# Patient Record
Sex: FEMALE | Race: BLACK OR AFRICAN AMERICAN | Employment: PART TIME | URBAN - METROPOLITAN AREA
[De-identification: names, ages, dates, MRNs, and addresses within clinical notes are randomized per-mention and may not be internally consistent; named-entity substitution may affect disease eponyms.]

---

## 2017-02-17 ENCOUNTER — OFFICE VISIT (OUTPATIENT)
Dept: FAMILY MEDICINE CLINIC | Age: 57
End: 2017-02-17

## 2017-02-17 VITALS
BODY MASS INDEX: 30.29 KG/M2 | RESPIRATION RATE: 16 BRPM | SYSTOLIC BLOOD PRESSURE: 122 MMHG | DIASTOLIC BLOOD PRESSURE: 78 MMHG | WEIGHT: 182 LBS | HEART RATE: 84 BPM

## 2017-02-17 DIAGNOSIS — Z98.890 H/O CEREBRAL ANEURYSM REPAIR: ICD-10-CM

## 2017-02-17 DIAGNOSIS — Z86.79 H/O CEREBRAL ANEURYSM REPAIR: ICD-10-CM
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
## 2017-02-17 DIAGNOSIS — G44.311 INTRACTABLE ACUTE POST-TRAUMATIC HEADACHE: Primary | ICD-10-CM

## 2017-02-17 PROCEDURE — 99213 OFFICE O/P EST LOW 20 MIN: CPT | Performed by: FAMILY MEDICINE

## 2017-02-17 RX ORDER — NAPROXEN 500 MG/1
500 TABLET ORAL
COMMUNITY
Start: 2017-02-14 | End: 2017-12-11

## 2017-02-17 RX ORDER — ANTIARTHRITIC COMBINATION NO.2 900 MG
10000 TABLET ORAL ONCE
COMMUNITY

## 2017-02-17 RX ORDER — HYDROCODONE BITARTRATE AND ACETAMINOPHEN 5; 325 MG/1; MG/1
1-2 TABLET ORAL
COMMUNITY
Start: 2017-02-14 | End: 2017-12-11

## 2017-02-17 RX ORDER — METHOCARBAMOL 750 MG/1
750 TABLET, FILM COATED ORAL
COMMUNITY
Start: 2017-02-14 | End: 2017-02-24 | Stop reason: SDUPTHER

## 2017-02-20 DIAGNOSIS — B35.1 ONYCHOMYCOSIS: ICD-10-CM

## 2017-02-20 LAB
ALBUMIN SERPL-MCNC: 4.3 G/DL (ref 3.4–5)
ALP BLD-CCNC: 79 U/L (ref 40–129)
ALT SERPL-CCNC: 14 U/L (ref 10–40)
AST SERPL-CCNC: 25 U/L (ref 15–37)
BASOPHILS ABSOLUTE: 0 K/UL (ref 0–0.2)
BASOPHILS RELATIVE PERCENT: 0.6 %
BILIRUB SERPL-MCNC: 0.3 MG/DL (ref 0–1)
BILIRUBIN DIRECT: <0.2 MG/DL (ref 0–0.3)
BILIRUBIN, INDIRECT: NORMAL MG/DL (ref 0–1)
EOSINOPHILS ABSOLUTE: 0.1 K/UL (ref 0–0.6)
EOSINOPHILS RELATIVE PERCENT: 2.1 %
HCT VFR BLD CALC: 39.6 % (ref 36–48)
HEMOGLOBIN: 12.7 G/DL (ref 12–16)
LYMPHOCYTES ABSOLUTE: 1.3 K/UL (ref 1–5.1)
LYMPHOCYTES RELATIVE PERCENT: 27.1 %
MCH RBC QN AUTO: 28.6 PG (ref 26–34)
MCHC RBC AUTO-ENTMCNC: 32.1 G/DL (ref 31–36)
MCV RBC AUTO: 89.1 FL (ref 80–100)
MONOCYTES ABSOLUTE: 0.3 K/UL (ref 0–1.3)
MONOCYTES RELATIVE PERCENT: 6 %
NEUTROPHILS ABSOLUTE: 3.2 K/UL (ref 1.7–7.7)
NEUTROPHILS RELATIVE PERCENT: 64.2 %
PDW BLD-RTO: 15.3 % (ref 12.4–15.4)
PLATELET # BLD: 172 K/UL (ref 135–450)
PMV BLD AUTO: 10.6 FL (ref 5–10.5)
RBC # BLD: 4.45 M/UL (ref 4–5.2)
TOTAL PROTEIN: 7.4 G/DL (ref 6.4–8.2)
WBC # BLD: 4.9 K/UL (ref 4–11)

## 2017-02-24 ENCOUNTER — TELEPHONE (OUTPATIENT)
Dept: FAMILY MEDICINE CLINIC | Age: 57
End: 2017-02-24

## 2017-02-24 RX ORDER — METHOCARBAMOL 750 MG/1
750 TABLET, FILM COATED ORAL 2 TIMES DAILY PRN
Qty: 30 TABLET | Refills: 0 | Status: SHIPPED | OUTPATIENT
Start: 2017-02-24 | End: 2017-03-09 | Stop reason: SDUPTHER

## 2017-03-02 ENCOUNTER — TELEPHONE (OUTPATIENT)
Dept: FAMILY MEDICINE CLINIC | Age: 57
End: 2017-03-02

## 2017-03-10 RX ORDER — METHOCARBAMOL 750 MG/1
750 TABLET, FILM COATED ORAL 2 TIMES DAILY PRN
Qty: 30 TABLET | Refills: 0 | Status: SHIPPED | OUTPATIENT
Start: 2017-03-10 | End: 2017-03-29 | Stop reason: SDUPTHER

## 2017-03-29 RX ORDER — METHOCARBAMOL 750 MG/1
750 TABLET, FILM COATED ORAL 2 TIMES DAILY PRN
Qty: 30 TABLET | Refills: 0 | Status: SHIPPED | OUTPATIENT
Start: 2017-03-29 | End: 2017-12-11

## 2017-04-01 RX ORDER — VALSARTAN/HYDROCHLOROTHIAZIDE 160MG-25MG
TABLET ORAL
Qty: 30 TABLET | Refills: 3 | Status: SHIPPED | OUTPATIENT
Start: 2017-04-01 | End: 2017-04-07 | Stop reason: SDUPTHER

## 2017-04-07 RX ORDER — VALSARTAN/HYDROCHLOROTHIAZIDE 160MG-25MG
TABLET ORAL
Qty: 30 TABLET | Refills: 3 | Status: SHIPPED | OUTPATIENT
Start: 2017-04-07 | End: 2017-07-25 | Stop reason: SDUPTHER

## 2017-07-25 RX ORDER — VALSARTAN/HYDROCHLOROTHIAZIDE 160MG-25MG
TABLET ORAL
Qty: 30 TABLET | Refills: 3 | Status: SHIPPED | OUTPATIENT
Start: 2017-07-25 | End: 2017-11-29 | Stop reason: SDUPTHER

## 2017-12-06 ENCOUNTER — TELEPHONE (OUTPATIENT)
Dept: FAMILY MEDICINE CLINIC | Age: 57
End: 2017-12-06

## 2017-12-06 NOTE — TELEPHONE ENCOUNTER
Patient went to ER on 11-21-17 for stomach virus  Labs were drawn and CT and patient was informed to contact PCP re: some abnormalities.   Do not see lab results in Epic  CT of abdomen was normal.  Please contact patient

## 2017-12-08 ENCOUNTER — TELEPHONE (OUTPATIENT)
Dept: FAMILY MEDICINE CLINIC | Age: 57
End: 2017-12-08

## 2017-12-08 DIAGNOSIS — Z00.00 PHYSICAL EXAM: ICD-10-CM

## 2017-12-08 DIAGNOSIS — I10 ESSENTIAL HYPERTENSION: Primary | ICD-10-CM

## 2017-12-08 NOTE — TELEPHONE ENCOUNTER
Pt has an annual physical scheduled for 12/11 and is asking if she needs labs completed. Please advise.   Please call pt once orders have been placed

## 2017-12-11 ENCOUNTER — OFFICE VISIT (OUTPATIENT)
Dept: FAMILY MEDICINE CLINIC | Age: 57
End: 2017-12-11

## 2017-12-11 VITALS
WEIGHT: 176 LBS | SYSTOLIC BLOOD PRESSURE: 122 MMHG | DIASTOLIC BLOOD PRESSURE: 88 MMHG | BODY MASS INDEX: 29.32 KG/M2 | HEART RATE: 73 BPM | HEIGHT: 65 IN | RESPIRATION RATE: 16 BRPM | OXYGEN SATURATION: 99 %

## 2017-12-11 DIAGNOSIS — Z12.39 SCREENING FOR BREAST CANCER: ICD-10-CM

## 2017-12-11 DIAGNOSIS — I10 ESSENTIAL HYPERTENSION: ICD-10-CM

## 2017-12-11 DIAGNOSIS — R74.8 ELEVATED AMYLASE: ICD-10-CM

## 2017-12-11 DIAGNOSIS — R51.9 HEADACHE, UNSPECIFIED HEADACHE TYPE: ICD-10-CM

## 2017-12-11 DIAGNOSIS — Z00.00 WELL WOMAN EXAM WITHOUT GYNECOLOGICAL EXAM: Primary | ICD-10-CM

## 2017-12-11 DIAGNOSIS — N20.0 KIDNEY STONE ON RIGHT SIDE: ICD-10-CM

## 2017-12-11 DIAGNOSIS — R11.2 NAUSEA AND VOMITING, INTRACTABILITY OF VOMITING NOT SPECIFIED, UNSPECIFIED VOMITING TYPE: ICD-10-CM

## 2017-12-11 LAB
BILIRUBIN, POC: NORMAL
BLOOD URINE, POC: NORMAL
CLARITY, POC: CLEAR
COLOR, POC: YELLOW
GLUCOSE URINE, POC: NORMAL
KETONES, POC: NORMAL
LEUKOCYTE EST, POC: NORMAL
NITRITE, POC: NORMAL
PH, POC: 6.5
PROTEIN, POC: NORMAL
SPECIFIC GRAVITY, POC: 1.02
UROBILINOGEN, POC: 0.2

## 2017-12-11 PROCEDURE — 81002 URINALYSIS NONAUTO W/O SCOPE: CPT | Performed by: NURSE PRACTITIONER

## 2017-12-11 PROCEDURE — 99396 PREV VISIT EST AGE 40-64: CPT | Performed by: NURSE PRACTITIONER

## 2017-12-11 RX ORDER — VALSARTAN/HYDROCHLOROTHIAZIDE 160MG-25MG
1 TABLET ORAL DAILY
Qty: 90 TABLET | Refills: 3 | Status: SHIPPED | OUTPATIENT
Start: 2017-12-11 | End: 2019-02-25 | Stop reason: SDUPTHER

## 2017-12-11 RX ORDER — BUTALBITAL, ACETAMINOPHEN AND CAFFEINE 50; 325; 40 MG/1; MG/1; MG/1
1 TABLET ORAL EVERY 4 HOURS PRN
Qty: 180 TABLET | Refills: 0 | Status: SHIPPED | OUTPATIENT
Start: 2017-12-11 | End: 2018-02-12 | Stop reason: SDUPTHER

## 2017-12-11 ASSESSMENT — PATIENT HEALTH QUESTIONNAIRE - PHQ9
1. LITTLE INTEREST OR PLEASURE IN DOING THINGS: 0
2. FEELING DOWN, DEPRESSED OR HOPELESS: 0
SUM OF ALL RESPONSES TO PHQ QUESTIONS 1-9: 0
SUM OF ALL RESPONSES TO PHQ9 QUESTIONS 1 & 2: 0

## 2017-12-11 NOTE — PROGRESS NOTES
Chief Complaint:   Minoo Samson is a 62 y.o. female who presents for complete physical examination. History of Present Illness:    Here for CPE and ED follow up  [address] ED 11/21/17 for LLQ abd pain and emesis. CT abd/pelvis normal. Labs in ED: Amylase, AST, total bilirubin elevated. Incidental finding on CT- right nonobstructing kidney stone, 4 mm. Denies flank pain or urinary symptoms. States since ED feeling fine. No complaints today. abd pain and emesis resolved within 48 hours. No reoccurrence since. Hypertension:  Home blood pressure monitoring: No.  She is adherent to a low sodium diet. Patient denies chest pain, shortness of breath, headache, lightheadedness, blurred vision, peripheral edema, palpitations and dry cough. Antihypertensive medication side effects: no medication side effects noted. Use of agents associated with hypertension: none. She is  exercising regularly. Is the patient taking any over the counter medications? Yes   If yes, see med list as above  Is the patient taking a daily aspirin? No    Headaches: well controlled at present. Fioricet at home. Last prescribed 9/2016, still has pills left over. Can get couple headaches in 1 month. Usually mild and taking excedrin over the counter- most of the time that manages the pain.      Past Medical History:   Diagnosis Date    Brain aneurysm 08/2011    Dr Vivi Litten:     Headache(784.0)     Hypertension     Kidney stone on right side 11/21/2017    on CT, nonobstructing    Sarcoid ([address] 5/2015    bilateral eyes: Desi Harrison MD       Past Surgical History:   Procedure Laterality Date    BRAIN ANEURYSM SURGERY  8/2011    [facility]: Dr Maeve Kimble  08/2011    WISDOM TOOTH EXTRACTION         Outpatient Prescriptions Marked as Taking for the 12/11/17 encounter (Office Visit) with Dunia Branch CNP   Medication Sig Dispense Refill    DIOVAN -25 MG per tablet Take 1 tablet by mouth daily 90 tablet 3    or abdominal pain  Genitourinary: no urinary urgency, frequency, dysuria, nocturia, hesitancy, or incontinence  Musculoskeletal: no arthritis, arthralgia, myalgia, weakness, or morning stiffness  Neurologic: no paralysis, paresis, paresthesia, seizures, tremors, or headaches  Hematologic/Lymphatic/Immunologic: no anemia, abnormal bleeding/bruising, fever, chills, night sweats, swollen glands, or unexplained weight loss  Endocrine: no heat or cold intolerance and no polyphagia, polydipsia, or polyuria    PHYSICAL EXAMINATION:  /88 (Site: Left Arm, Position: Sitting, Cuff Size: Small Adult)   Pulse 73   Resp 16   Ht 5' 5\" (1.651 m)   Wt 176 lb (79.8 kg)   SpO2 99%   BMI 29.29 kg/m²   General appearance: healthy, alert, no distress  Skin: Skin color, texture, turgor normal. No rashes or suspicious lesions. No induration or tightening palpated. Head: Normocephalic. No masses, lesions, tenderness or abnormalities  Eyes: Conjunctivae/corneas clear. PERRL, EOM's intact. Ears: External ears normal. Canals clear. TM's clear bilaterally. Hearing grossly normal.  Nose/Sinuses: Nares normal. Septum midline. Mucosa normal. No drainage or sinus tenderness. Oropharynx: Lips, mucosa, and tongue normal. Teeth and gums normal. Oropharynx clear with no exudate seen. Neck: Neck supple, and symmetric. No adenopathy. Thyroid symmetric, normal size, without nodule. Trachea is midline. No carotid bruits were noted. Back: Back symmetric, no curvature. ROM normal. No CVA tenderness. Lungs: Good diaphragmatic excursion. Lungs clear to auscultation bilaterally. No retractions or use of accessory muscles. Heart: PMI is not displaced, and no thrill noted. Regular rate and rhythm, with no rub, murmur or gallop noted. Breasts: Exam deferred to OB/GYN  Abdomen: Abdomen soft, non-tender. BS normal. No masses, organomegaly. No hernia noted. Extremities: Extremities normal. No deformities, edema, or skin discoloration.   No cyanosis or clubbing noted to the nails. Hands and feet were warm and well-perfused with palpable dorsalis pedis pulses bilaterally. Lymph: No lymphadenopathy of the neck or supraclavicular regions. Musculoskeletal: Spine ROM normal. Muscular strength intact. Neuro: Cranial nerves intact, gait normal. Reflexes normal and symmetric, with no pathologic reflex noted. Normal sensation to light touch. Pelvic: Exam deferred to OB/GYN        UA:   Results for POC orders placed in visit on 12/11/17   POCT Urinalysis no Micro   Result Value Ref Range    Color, UA yellow     Clarity, UA clear     Glucose, UA POC neg     Bilirubin, UA neg     Ketones, UA neg     Spec Grav, UA 1.025     Blood, UA POC neg     pH, UA 6.5     Protein, UA POC trace     Urobilinogen, UA 0.2     Leukocytes, UA trace     Nitrite, UA neg            ASSESSMENT:   Complete physical without pap. 1. Well woman exam without gynecological exam    2. Kidney stone on right side    3. Elevated amylase    4. Essential hypertension    5. Nausea and vomiting, intractability of vomiting not specified, unspecified vomiting type    6. Headache, unspecified headache type    7. Screening for breast cancer          PLAN:   1. All health maintenance issues were updated. Recommend attempt to lose weight, continue current medications, continue current healthy lifestyle patterns and return for routine annual checkups  2. New problem. Reassurance today. No intervention needed at this time. 3. Repeat lab ordered  4. Stable, refills per orders. No changes today  5. Resolved. ED records reviewed. Follow up prn  6. Stable, refills per orders, no changes today  See pt instructions  F/u 6 months for BP check. Discussed use, benefit, and side effects of prescribed medications. All patient questions answered. Pt voiced understanding.

## 2017-12-11 NOTE — PATIENT INSTRUCTIONS
[facility] lab hours: Monday-Friday 7:30 am to 4 pm. No appointment necessary. First come first serve. Sign in at . Nothing but water for 10 hours for fasting labs. Increase water 24 hours before lab draw. [facility] [phone]   [place] [phone]    Lucho Verma [address] [facility].[place] [address] [phone]      Patient Education        Well Visit, Women 48 to 72: Care Instructions  Your Care Instructions    Physical exams can help you stay healthy. Your doctor has checked your overall health and may have suggested ways to take good care of yourself. He or she also may have recommended tests. At home, you can help prevent illness with healthy eating, regular exercise, and other steps. Follow-up care is a key part of your treatment and safety. Be sure to make and go to all appointments, and call your doctor if you are having problems. It's also a good idea to know your test results and keep a list of the medicines you take. How can you care for yourself at home? · Reach and stay at a healthy weight. This will lower your risk for many problems, such as obesity, diabetes, heart disease, and high blood pressure. · Get at least 30 minutes of exercise on most days of the week. Walking is a good choice. You also may want to do other activities, such as running, swimming, cycling, or playing tennis or team sports. · Do not smoke. Smoking can make health problems worse. If you need help quitting, talk to your doctor about stop-smoking programs and medicines. These can increase your chances of quitting for good. · Protect your skin from too much sun. When you're outdoors from 10 a.m. to 4 p.m., stay in the shade or cover up with clothing and a hat with a wide brim. Wear sunglasses that block UV rays. Even when it's cloudy, put broad-spectrum sunscreen (SPF 30 or higher) on any exposed skin.   · See a dentist one or two times a year for checkups and to have your teeth cleaned. · Wear a seat belt in the car. · Limit alcohol to 1 drink a day. Too much alcohol can cause health problems. Follow your doctor's advice about when to have certain tests. These tests can spot problems early. · Cholesterol. Your doctor will tell you how often to have this done based on your age, family history, or other things that can increase your risk for heart attack and stroke. · Blood pressure. Have your blood pressure checked during a routine doctor visit. Your doctor will tell you how often to check your blood pressure based on your age, your blood pressure results, and other factors. · Mammogram. Ask your doctor how often you should have a mammogram, which is an X-ray of your breasts. A mammogram can spot breast cancer before it can be felt and when it is easiest to treat. · Pap test and pelvic exam. Ask your doctor how often you should have a Pap test. You may not need to have a Pap test as often as you used to. · Vision. Have your eyes checked every year or two or as often as your doctor suggests. Some experts recommend that you have yearly exams for glaucoma and other age-related eye problems starting at age 48. · Hearing. Tell your doctor if you notice any change in your hearing. You can have tests to find out how well you hear. · Diabetes. Ask your doctor whether you should have tests for diabetes. · Colon cancer. You should begin tests for colon cancer at age 48. You may have one of several tests. Your doctor will tell you how often to have tests based on your age and risk. Risks include whether you already had a precancerous polyp removed from your colon or whether your parents, sisters and brothers, or children have had colon cancer. · Thyroid disease. Talk to your doctor about whether to have your thyroid checked as part of a regular physical exam. Women have an increased chance of a thyroid problem. · Osteoporosis.  You should begin tests for

## 2017-12-15 DIAGNOSIS — Z00.00 PHYSICAL EXAM: ICD-10-CM

## 2017-12-15 DIAGNOSIS — I10 ESSENTIAL HYPERTENSION: ICD-10-CM

## 2017-12-15 DIAGNOSIS — R74.8 ELEVATED AMYLASE: ICD-10-CM

## 2017-12-15 LAB
A/G RATIO: 1.5 (ref 1.1–2.2)
ALBUMIN SERPL-MCNC: 4.6 G/DL (ref 3.4–5)
ALP BLD-CCNC: 66 U/L (ref 40–129)
ALT SERPL-CCNC: 13 U/L (ref 10–40)
AMYLASE: 125 U/L (ref 25–115)
ANION GAP SERPL CALCULATED.3IONS-SCNC: 14 MMOL/L (ref 3–16)
AST SERPL-CCNC: 24 U/L (ref 15–37)
BASOPHILS ABSOLUTE: 0 K/UL (ref 0–0.2)
BASOPHILS RELATIVE PERCENT: 0.4 %
BILIRUB SERPL-MCNC: 0.4 MG/DL (ref 0–1)
BUN BLDV-MCNC: 15 MG/DL (ref 7–20)
CALCIUM SERPL-MCNC: 9.7 MG/DL (ref 8.3–10.6)
CHLORIDE BLD-SCNC: 102 MMOL/L (ref 99–110)
CHOLESTEROL, TOTAL: 184 MG/DL (ref 0–199)
CO2: 27 MMOL/L (ref 21–32)
CREAT SERPL-MCNC: 0.7 MG/DL (ref 0.6–1.1)
EOSINOPHILS ABSOLUTE: 0.1 K/UL (ref 0–0.6)
EOSINOPHILS RELATIVE PERCENT: 1.2 %
GFR AFRICAN AMERICAN: >60
GFR NON-AFRICAN AMERICAN: >60
GLOBULIN: 3.1 G/DL
GLUCOSE BLD-MCNC: 78 MG/DL (ref 70–99)
HCT VFR BLD CALC: 38.8 % (ref 36–48)
HDLC SERPL-MCNC: 66 MG/DL (ref 40–60)
HEMOGLOBIN: 12.6 G/DL (ref 12–16)
HEPATITIS C ANTIBODY INTERPRETATION: NORMAL
LDL CHOLESTEROL CALCULATED: 105 MG/DL
LYMPHOCYTES ABSOLUTE: 1.4 K/UL (ref 1–5.1)
LYMPHOCYTES RELATIVE PERCENT: 33 %
MCH RBC QN AUTO: 29.1 PG (ref 26–34)
MCHC RBC AUTO-ENTMCNC: 32.4 G/DL (ref 31–36)
MCV RBC AUTO: 89.8 FL (ref 80–100)
MONOCYTES ABSOLUTE: 0.3 K/UL (ref 0–1.3)
MONOCYTES RELATIVE PERCENT: 6.3 %
NEUTROPHILS ABSOLUTE: 2.5 K/UL (ref 1.7–7.7)
NEUTROPHILS RELATIVE PERCENT: 59.1 %
PDW BLD-RTO: 14.5 % (ref 12.4–15.4)
PLATELET # BLD: 168 K/UL (ref 135–450)
PMV BLD AUTO: 10.7 FL (ref 5–10.5)
POTASSIUM SERPL-SCNC: 4.3 MMOL/L (ref 3.5–5.1)
RBC # BLD: 4.32 M/UL (ref 4–5.2)
SODIUM BLD-SCNC: 143 MMOL/L (ref 136–145)
TOTAL PROTEIN: 7.7 G/DL (ref 6.4–8.2)
TRIGL SERPL-MCNC: 67 MG/DL (ref 0–150)
TSH SERPL DL<=0.05 MIU/L-ACNC: 0.82 UIU/ML (ref 0.27–4.2)
VLDLC SERPL CALC-MCNC: 13 MG/DL
WBC # BLD: 4.3 K/UL (ref 4–11)

## 2017-12-18 LAB — HIV-1 AND HIV-2 ANTIBODIES: NORMAL

## 2017-12-19 ENCOUNTER — TELEPHONE (OUTPATIENT)
Dept: FAMILY MEDICINE CLINIC | Age: 57
End: 2017-12-19

## 2017-12-26 ENCOUNTER — OFFICE VISIT (OUTPATIENT)
Dept: FAMILY MEDICINE CLINIC | Age: 57
End: 2017-12-26

## 2017-12-26 VITALS
RESPIRATION RATE: 15 BRPM | SYSTOLIC BLOOD PRESSURE: 144 MMHG | OXYGEN SATURATION: 98 % | HEART RATE: 92 BPM | BODY MASS INDEX: 30.12 KG/M2 | WEIGHT: 181 LBS | DIASTOLIC BLOOD PRESSURE: 96 MMHG

## 2017-12-26 DIAGNOSIS — S76.911A MUSCLE STRAIN OF RIGHT THIGH, INITIAL ENCOUNTER: Primary | ICD-10-CM

## 2017-12-26 DIAGNOSIS — I10 ESSENTIAL HYPERTENSION: ICD-10-CM

## 2017-12-26 PROCEDURE — 99213 OFFICE O/P EST LOW 20 MIN: CPT | Performed by: FAMILY MEDICINE

## 2017-12-26 RX ORDER — NAPROXEN 500 MG/1
500 TABLET ORAL 2 TIMES DAILY WITH MEALS
Qty: 20 TABLET | Refills: 3 | Status: SHIPPED | OUTPATIENT
Start: 2017-12-26 | End: 2021-03-26

## 2018-02-12 ENCOUNTER — TELEPHONE (OUTPATIENT)
Dept: FAMILY MEDICINE CLINIC | Age: 58
End: 2018-02-12

## 2018-02-12 DIAGNOSIS — R51.9 HEADACHE, UNSPECIFIED HEADACHE TYPE: ICD-10-CM

## 2018-02-12 RX ORDER — BUTALBITAL, ACETAMINOPHEN AND CAFFEINE 50; 325; 40 MG/1; MG/1; MG/1
1 TABLET ORAL EVERY 4 HOURS PRN
Qty: 16 TABLET | Refills: 0 | Status: SHIPPED | OUTPATIENT
Start: 2018-02-12 | End: 2018-08-06

## 2018-02-12 RX ORDER — BUTALBITAL, ACETAMINOPHEN AND CAFFEINE 50; 325; 40 MG/1; MG/1; MG/1
1 TABLET ORAL EVERY 4 HOURS PRN
Qty: 30 TABLET | Refills: 0 | OUTPATIENT
Start: 2018-02-12

## 2018-02-12 NOTE — TELEPHONE ENCOUNTER
Patient is in [place] and left her generic Fioricett at home  Patient is asking if a temporary supply of the RX- #16  Patient will be in [place] until Sunday  Please contact patient  Pharmacy in [address] [place] [address] Rd  Dolv 12-26-17

## 2018-02-12 NOTE — TELEPHONE ENCOUNTER
Patient's , July Lamb is asking if Dr. Osmel Mclaughlin can call. Very upset that this medication, generic Fioricet, can not be sent to another state.   Patient's understood RX can not go to another state    # [phone]- please contact patient  Sent message to Saige Razo and also Dr. Osmel Mclaughlin since patient's  is asking for him to call

## 2018-05-30 ENCOUNTER — TELEPHONE (OUTPATIENT)
Dept: FAMILY MEDICINE CLINIC | Age: 58
End: 2018-05-30

## 2018-06-07 ENCOUNTER — OFFICE VISIT (OUTPATIENT)
Dept: FAMILY MEDICINE CLINIC | Age: 58
End: 2018-06-07

## 2018-06-07 VITALS
OXYGEN SATURATION: 99 % | BODY MASS INDEX: 29.52 KG/M2 | HEART RATE: 77 BPM | WEIGHT: 177.4 LBS | SYSTOLIC BLOOD PRESSURE: 130 MMHG | DIASTOLIC BLOOD PRESSURE: 82 MMHG

## 2018-06-07 DIAGNOSIS — R39.9 UTI SYMPTOMS: ICD-10-CM

## 2018-06-07 DIAGNOSIS — M54.50 ACUTE LEFT-SIDED LOW BACK PAIN WITHOUT SCIATICA: Primary | ICD-10-CM

## 2018-06-07 DIAGNOSIS — L65.9 ALOPECIA: ICD-10-CM

## 2018-06-07 LAB
BILIRUBIN, POC: NORMAL
BLOOD URINE, POC: NORMAL
CLARITY, POC: CLEAR
COLOR, POC: YELLOW
GLUCOSE URINE, POC: NORMAL
KETONES, POC: NORMAL
LEUKOCYTE EST, POC: NORMAL
NITRITE, POC: NORMAL
PH, POC: 7
PROTEIN, POC: NORMAL
SPECIFIC GRAVITY, POC: 1.02
UROBILINOGEN, POC: 0.2

## 2018-06-07 PROCEDURE — 81002 URINALYSIS NONAUTO W/O SCOPE: CPT | Performed by: FAMILY MEDICINE

## 2018-06-07 PROCEDURE — 99213 OFFICE O/P EST LOW 20 MIN: CPT | Performed by: FAMILY MEDICINE

## 2018-06-07 ASSESSMENT — ENCOUNTER SYMPTOMS
RESPIRATORY NEGATIVE: 1
BACK PAIN: 1
GASTROINTESTINAL NEGATIVE: 1

## 2018-08-05 DIAGNOSIS — R51.9 HEADACHE, UNSPECIFIED HEADACHE TYPE: ICD-10-CM

## 2018-08-06 RX ORDER — BUTALBITAL, ACETAMINOPHEN AND CAFFEINE 50; 325; 40 MG/1; MG/1; MG/1
TABLET ORAL
Qty: 180 TABLET | Refills: 0 | Status: SHIPPED | OUTPATIENT
Start: 2018-08-06 | End: 2018-12-31 | Stop reason: SDUPTHER

## 2018-11-02 ENCOUNTER — TELEPHONE (OUTPATIENT)
Dept: FAMILY MEDICINE CLINIC | Age: 58
End: 2018-11-02

## 2018-11-07 ENCOUNTER — OFFICE VISIT (OUTPATIENT)
Dept: FAMILY MEDICINE CLINIC | Age: 58
End: 2018-11-07
Payer: COMMERCIAL

## 2018-11-07 VITALS
RESPIRATION RATE: 16 BRPM | WEIGHT: 180.2 LBS | DIASTOLIC BLOOD PRESSURE: 82 MMHG | SYSTOLIC BLOOD PRESSURE: 140 MMHG | BODY MASS INDEX: 29.99 KG/M2 | OXYGEN SATURATION: 99 % | HEART RATE: 77 BPM

## 2018-11-07 DIAGNOSIS — J06.9 PROTRACTED URI: Primary | ICD-10-CM

## 2018-11-07 DIAGNOSIS — Z13.31 POSITIVE DEPRESSION SCREENING: ICD-10-CM

## 2018-11-07 DIAGNOSIS — R05.9 COUGH: ICD-10-CM

## 2018-11-07 PROCEDURE — G8431 POS CLIN DEPRES SCRN F/U DOC: HCPCS | Performed by: NURSE PRACTITIONER

## 2018-11-07 PROCEDURE — G0444 DEPRESSION SCREEN ANNUAL: HCPCS | Performed by: NURSE PRACTITIONER

## 2018-11-07 PROCEDURE — 99213 OFFICE O/P EST LOW 20 MIN: CPT | Performed by: NURSE PRACTITIONER

## 2018-11-07 RX ORDER — BENZONATATE 200 MG/1
200 CAPSULE ORAL 3 TIMES DAILY PRN
Qty: 20 CAPSULE | Refills: 0 | Status: SHIPPED | OUTPATIENT
Start: 2018-11-07 | End: 2018-11-14

## 2018-11-07 RX ORDER — AZITHROMYCIN 250 MG/1
TABLET, FILM COATED ORAL
Qty: 1 PACKET | Refills: 0 | Status: SHIPPED | OUTPATIENT
Start: 2018-11-07 | End: 2019-04-24 | Stop reason: SDUPTHER

## 2018-11-07 ASSESSMENT — PATIENT HEALTH QUESTIONNAIRE - PHQ9
3. TROUBLE FALLING OR STAYING ASLEEP: 0
6. FEELING BAD ABOUT YOURSELF - OR THAT YOU ARE A FAILURE OR HAVE LET YOURSELF OR YOUR FAMILY DOWN: 0
SUM OF ALL RESPONSES TO PHQ QUESTIONS 1-9: 9
5. POOR APPETITE OR OVEREATING: 2
2. FEELING DOWN, DEPRESSED OR HOPELESS: 3
8. MOVING OR SPEAKING SO SLOWLY THAT OTHER PEOPLE COULD HAVE NOTICED. OR THE OPPOSITE, BEING SO FIGETY OR RESTLESS THAT YOU HAVE BEEN MOVING AROUND A LOT MORE THAN USUAL: 0
10. IF YOU CHECKED OFF ANY PROBLEMS, HOW DIFFICULT HAVE THESE PROBLEMS MADE IT FOR YOU TO DO YOUR WORK, TAKE CARE OF THINGS AT HOME, OR GET ALONG WITH OTHER PEOPLE: 0
SUM OF ALL RESPONSES TO PHQ QUESTIONS 1-9: 9
SUM OF ALL RESPONSES TO PHQ9 QUESTIONS 1 & 2: 5
1. LITTLE INTEREST OR PLEASURE IN DOING THINGS: 2
4. FEELING TIRED OR HAVING LITTLE ENERGY: 1
7. TROUBLE CONCENTRATING ON THINGS, SUCH AS READING THE NEWSPAPER OR WATCHING TELEVISION: 1
9. THOUGHTS THAT YOU WOULD BE BETTER OFF DEAD, OR OF HURTING YOURSELF: 0

## 2018-11-07 ASSESSMENT — ENCOUNTER SYMPTOMS
CHEST TIGHTNESS: 1
COUGH: 1
SINUS PRESSURE: 1
RHINORRHEA: 1
SORE THROAT: 1
WHEEZING: 0
SHORTNESS OF BREATH: 1

## 2018-11-07 NOTE — PATIENT INSTRUCTIONS
Patient Education        Upper Respiratory Infection (Cold): Care Instructions  Your Care Instructions    An upper respiratory infection, or URI, is an infection of the nose, sinuses, or throat. URIs are spread by coughs, sneezes, and direct contact. The common cold is the most frequent kind of URI. The flu and sinus infections are other kinds of URIs. Almost all URIs are caused by viruses. Antibiotics won't cure them. But you can treat most infections with home care. This may include drinking lots of fluids and taking over-the-counter pain medicine. You will probably feel better in 4 to 10 days. The doctor has checked you carefully, but problems can develop later. If you notice any problems or new symptoms, get medical treatment right away. Follow-up care is a key part of your treatment and safety. Be sure to make and go to all appointments, and call your doctor if you are having problems. It's also a good idea to know your test results and keep a list of the medicines you take. How can you care for yourself at home? · To prevent dehydration, drink plenty of fluids, enough so that your urine is light yellow or clear like water. Choose water and other caffeine-free clear liquids until you feel better. If you have kidney, heart, or liver disease and have to limit fluids, talk with your doctor before you increase the amount of fluids you drink. · Take an over-the-counter pain medicine, such as acetaminophen (Tylenol), ibuprofen (Advil, Motrin), or naproxen (Aleve). Read and follow all instructions on the label. · Before you use cough and cold medicines, check the label. These medicines may not be safe for young children or for people with certain health problems. · Be careful when taking over-the-counter cold or flu medicines and Tylenol at the same time. Many of these medicines have acetaminophen, which is Tylenol. Read the labels to make sure that you are not taking more than the recommended dose.  Too much acetaminophen (Tylenol) can be harmful. · Get plenty of rest.  · Do not smoke or allow others to smoke around you. If you need help quitting, talk to your doctor about stop-smoking programs and medicines. These can increase your chances of quitting for good. When should you call for help? Call 911 anytime you think you may need emergency care. For example, call if:    · You have severe trouble breathing.    Call your doctor now or seek immediate medical care if:    · You seem to be getting much sicker.     · You have new or worse trouble breathing.     · You have a new or higher fever.     · You have a new rash.    Watch closely for changes in your health, and be sure to contact your doctor if:    · You have a new symptom, such as a sore throat, an earache, or sinus pain.     · You cough more deeply or more often, especially if you notice more mucus or a change in the color of your mucus.     · You do not get better as expected. Where can you learn more? Go to https://Lineagen.Biexdiao.com. org and sign in to your Rambus account. Enter M464 in the Graphicly box to learn more about \"Upper Respiratory Infection (Cold): Care Instructions. \"     If you do not have an account, please click on the \"Sign Up Now\" link. Current as of: December 6, 2017  Content Version: 11.8  © 1902-4850 Healthwise, Incorporated. Care instructions adapted under license by [facility] ([facility]). If you have questions about a medical condition or this instruction, always ask your healthcare professional. [place] [address] any warranty or liability for your use of this information. Patient Education        Cough: Care Instructions  Your Care Instructions    A cough is your body's response to something that bothers your throat or airways. Many things can cause a cough. You might cough because of a cold or the flu, bronchitis, or asthma.  Smoking, postnasal drip, allergies, and stomach acid that backs up

## 2018-12-31 DIAGNOSIS — R51.9 HEADACHE, UNSPECIFIED HEADACHE TYPE: ICD-10-CM

## 2019-01-02 RX ORDER — BUTALBITAL, ACETAMINOPHEN AND CAFFEINE 50; 325; 40 MG/1; MG/1; MG/1
TABLET ORAL
Qty: 180 TABLET | Refills: 0 | Status: SHIPPED | OUTPATIENT
Start: 2019-01-02 | End: 2019-05-27 | Stop reason: SDUPTHER

## 2019-01-08 ENCOUNTER — NURSE TRIAGE (OUTPATIENT)
Dept: OTHER | Facility: CLINIC | Age: 59
End: 2019-01-08

## 2019-01-08 ENCOUNTER — TELEPHONE (OUTPATIENT)
Dept: FAMILY MEDICINE CLINIC | Age: 59
End: 2019-01-08

## 2019-01-08 ENCOUNTER — OFFICE VISIT (OUTPATIENT)
Dept: FAMILY MEDICINE CLINIC | Age: 59
End: 2019-01-08
Payer: COMMERCIAL

## 2019-01-08 VITALS
WEIGHT: 180 LBS | DIASTOLIC BLOOD PRESSURE: 80 MMHG | HEART RATE: 77 BPM | HEIGHT: 65 IN | SYSTOLIC BLOOD PRESSURE: 128 MMHG | OXYGEN SATURATION: 96 % | TEMPERATURE: 98.9 F | BODY MASS INDEX: 29.99 KG/M2

## 2019-01-08 DIAGNOSIS — I10 ESSENTIAL HYPERTENSION: ICD-10-CM

## 2019-01-08 DIAGNOSIS — Z12.31 ENCOUNTER FOR SCREENING MAMMOGRAM FOR BREAST CANCER: Primary | ICD-10-CM

## 2019-01-08 DIAGNOSIS — R07.9 CHEST PAIN, UNSPECIFIED TYPE: ICD-10-CM

## 2019-01-08 DIAGNOSIS — D86.9 SARCOID: ICD-10-CM

## 2019-01-08 PROCEDURE — 93000 ELECTROCARDIOGRAM COMPLETE: CPT | Performed by: FAMILY MEDICINE

## 2019-01-08 PROCEDURE — 99213 OFFICE O/P EST LOW 20 MIN: CPT | Performed by: FAMILY MEDICINE

## 2019-01-08 RX ORDER — PANTOPRAZOLE SODIUM 40 MG/1
40 TABLET, DELAYED RELEASE ORAL
Qty: 30 TABLET | Refills: 5 | Status: SHIPPED | OUTPATIENT
Start: 2019-01-08 | End: 2020-08-10

## 2019-01-08 NOTE — TELEPHONE ENCOUNTER
Patient has been scheduled for physical with Dr. Boby Garcia on 1/29/19 she is requesting to get labs drawn 1/2 weeks prior to this visit. Please place orders so patient can get these completed.

## 2019-01-16 ENCOUNTER — HOSPITAL ENCOUNTER (OUTPATIENT)
Dept: NON INVASIVE DIAGNOSTICS | Age: 59
Discharge: HOME | End: 2019-01-16
Payer: COMMERCIAL

## 2019-01-16 ENCOUNTER — HOSPITAL ENCOUNTER (OUTPATIENT)
Dept: WOMENS IMAGING | Age: 59
Discharge: HOME | End: 2019-01-16
Payer: COMMERCIAL

## 2019-01-16 DIAGNOSIS — Z12.31 ENCOUNTER FOR SCREENING MAMMOGRAM FOR BREAST CANCER: ICD-10-CM

## 2019-01-16 DIAGNOSIS — R07.9 CHEST PAIN, UNSPECIFIED TYPE: ICD-10-CM

## 2019-01-16 DIAGNOSIS — Z12.39 BREAST CANCER SCREENING: ICD-10-CM

## 2019-01-16 PROCEDURE — 77067 SCR MAMMO BI INCL CAD: CPT

## 2019-01-16 PROCEDURE — 93017 CV STRESS TEST TRACING ONLY: CPT | Performed by: INTERNAL MEDICINE

## 2019-01-22 RX ORDER — VALSARTAN/HYDROCHLOROTHIAZIDE 160MG-25MG
TABLET ORAL
Qty: 90 TABLET | Refills: 1 | Status: SHIPPED | OUTPATIENT
Start: 2019-01-22 | End: 2019-02-25 | Stop reason: SDUPTHER

## 2019-02-21 DIAGNOSIS — Z00.00 WELL ADULT EXAM: ICD-10-CM

## 2019-02-21 DIAGNOSIS — I10 ESSENTIAL HYPERTENSION: ICD-10-CM

## 2019-02-21 LAB
A/G RATIO: 1.5 (ref 1.1–2.2)
ALBUMIN SERPL-MCNC: 4.7 G/DL (ref 3.4–5)
ALP BLD-CCNC: 85 U/L (ref 40–129)
ALT SERPL-CCNC: 17 U/L (ref 10–40)
ANION GAP SERPL CALCULATED.3IONS-SCNC: 16 MMOL/L (ref 3–16)
AST SERPL-CCNC: 25 U/L (ref 15–37)
BASOPHILS ABSOLUTE: 0.1 K/UL (ref 0–0.2)
BASOPHILS RELATIVE PERCENT: 1.3 %
BILIRUB SERPL-MCNC: 0.5 MG/DL (ref 0–1)
BUN BLDV-MCNC: 15 MG/DL (ref 7–20)
CALCIUM SERPL-MCNC: 10.3 MG/DL (ref 8.3–10.6)
CHLORIDE BLD-SCNC: 100 MMOL/L (ref 99–110)
CHOLESTEROL, TOTAL: 238 MG/DL (ref 0–199)
CO2: 27 MMOL/L (ref 21–32)
CREAT SERPL-MCNC: 0.8 MG/DL (ref 0.6–1.1)
EOSINOPHILS ABSOLUTE: 0 K/UL (ref 0–0.6)
EOSINOPHILS RELATIVE PERCENT: 0.7 %
GFR AFRICAN AMERICAN: >60
GFR NON-AFRICAN AMERICAN: >60
GLOBULIN: 3.1 G/DL
GLUCOSE BLD-MCNC: 75 MG/DL (ref 70–99)
HCT VFR BLD CALC: 40.3 % (ref 36–48)
HDLC SERPL-MCNC: 73 MG/DL (ref 40–60)
HEMOGLOBIN: 13.2 G/DL (ref 12–16)
LDL CHOLESTEROL CALCULATED: 151 MG/DL
LYMPHOCYTES ABSOLUTE: 1.4 K/UL (ref 1–5.1)
LYMPHOCYTES RELATIVE PERCENT: 33.9 %
MCH RBC QN AUTO: 29 PG (ref 26–34)
MCHC RBC AUTO-ENTMCNC: 32.6 G/DL (ref 31–36)
MCV RBC AUTO: 88.8 FL (ref 80–100)
MONOCYTES ABSOLUTE: 0.3 K/UL (ref 0–1.3)
MONOCYTES RELATIVE PERCENT: 6.6 %
NEUTROPHILS ABSOLUTE: 2.3 K/UL (ref 1.7–7.7)
NEUTROPHILS RELATIVE PERCENT: 57.5 %
PDW BLD-RTO: 13.9 % (ref 12.4–15.4)
PLATELET # BLD: 180 K/UL (ref 135–450)
PMV BLD AUTO: 10.4 FL (ref 5–10.5)
POTASSIUM SERPL-SCNC: 4.3 MMOL/L (ref 3.5–5.1)
RBC # BLD: 4.54 M/UL (ref 4–5.2)
SODIUM BLD-SCNC: 143 MMOL/L (ref 136–145)
TOTAL PROTEIN: 7.8 G/DL (ref 6.4–8.2)
TRIGL SERPL-MCNC: 72 MG/DL (ref 0–150)
TSH REFLEX: 1.01 UIU/ML (ref 0.27–4.2)
VLDLC SERPL CALC-MCNC: 14 MG/DL
WBC # BLD: 4 K/UL (ref 4–11)

## 2019-02-25 ENCOUNTER — OFFICE VISIT (OUTPATIENT)
Dept: FAMILY MEDICINE CLINIC | Age: 59
End: 2019-02-25
Payer: COMMERCIAL

## 2019-02-25 VITALS
DIASTOLIC BLOOD PRESSURE: 100 MMHG | WEIGHT: 178.8 LBS | OXYGEN SATURATION: 98 % | SYSTOLIC BLOOD PRESSURE: 132 MMHG | BODY MASS INDEX: 29.75 KG/M2 | HEART RATE: 69 BPM

## 2019-02-25 DIAGNOSIS — Z00.00 WELL ADULT EXAM: ICD-10-CM

## 2019-02-25 DIAGNOSIS — Z12.4 CERVICAL CANCER SCREENING: ICD-10-CM

## 2019-02-25 DIAGNOSIS — E78.00 PURE HYPERCHOLESTEROLEMIA: ICD-10-CM

## 2019-02-25 DIAGNOSIS — Z00.00 ANNUAL PHYSICAL EXAM: Primary | ICD-10-CM

## 2019-02-25 DIAGNOSIS — D86.9 SARCOID: ICD-10-CM

## 2019-02-25 DIAGNOSIS — I10 ESSENTIAL HYPERTENSION: ICD-10-CM

## 2019-02-25 LAB
BILIRUBIN, POC: NORMAL
BLOOD URINE, POC: NORMAL
CLARITY, POC: CLEAR
COLOR, POC: YELLOW
GLUCOSE URINE, POC: NORMAL
KETONES, POC: NORMAL
LEUKOCYTE EST, POC: NORMAL
NITRITE, POC: NORMAL
PH, POC: 5.5
PROTEIN, POC: NORMAL
SPECIFIC GRAVITY, POC: 1.03
UROBILINOGEN, POC: 0.2

## 2019-02-25 PROCEDURE — 81002 URINALYSIS NONAUTO W/O SCOPE: CPT | Performed by: FAMILY MEDICINE

## 2019-02-25 PROCEDURE — 99396 PREV VISIT EST AGE 40-64: CPT | Performed by: FAMILY MEDICINE

## 2019-02-25 RX ORDER — VALSARTAN/HYDROCHLOROTHIAZIDE 160MG-25MG
TABLET ORAL
Qty: 90 TABLET | Refills: 3 | Status: SHIPPED | OUTPATIENT
Start: 2019-02-25 | End: 2019-09-18 | Stop reason: SDUPTHER

## 2019-02-25 ASSESSMENT — PATIENT HEALTH QUESTIONNAIRE - PHQ9
2. FEELING DOWN, DEPRESSED OR HOPELESS: 0
SUM OF ALL RESPONSES TO PHQ9 QUESTIONS 1 & 2: 0
1. LITTLE INTEREST OR PLEASURE IN DOING THINGS: 0
SUM OF ALL RESPONSES TO PHQ QUESTIONS 1-9: 0
SUM OF ALL RESPONSES TO PHQ QUESTIONS 1-9: 0

## 2019-04-24 ENCOUNTER — TELEPHONE (OUTPATIENT)
Dept: FAMILY MEDICINE CLINIC | Age: 59
End: 2019-04-24

## 2019-04-24 DIAGNOSIS — J06.9 PROTRACTED URI: ICD-10-CM

## 2019-04-24 DIAGNOSIS — R05.9 COUGH: ICD-10-CM

## 2019-04-24 RX ORDER — AZITHROMYCIN 250 MG/1
TABLET, FILM COATED ORAL
Qty: 1 PACKET | Refills: 0 | Status: SHIPPED | OUTPATIENT
Start: 2019-04-24 | End: 2019-05-04

## 2019-05-27 DIAGNOSIS — R51.9 HEADACHE, UNSPECIFIED HEADACHE TYPE: ICD-10-CM

## 2019-05-28 RX ORDER — BUTALBITAL, ACETAMINOPHEN AND CAFFEINE 50; 325; 40 MG/1; MG/1; MG/1
TABLET ORAL
Qty: 180 TABLET | Refills: 0 | Status: SHIPPED | OUTPATIENT
Start: 2019-05-28 | End: 2019-10-28 | Stop reason: SDUPTHER

## 2019-07-10 ENCOUNTER — TELEPHONE (OUTPATIENT)
Dept: FAMILY MEDICINE CLINIC | Age: 59
End: 2019-07-10

## 2019-07-10 NOTE — TELEPHONE ENCOUNTER
Pt calling and states she is having some heaviness in her breathing, she states she was near a construction site and inhaled some debris. She drank water most of the day, she states she is just not feeling well. She is wondering should she do something to try to flush her lungs out? Sx started yesterday.  Please advise

## 2019-07-11 ENCOUNTER — OFFICE VISIT (OUTPATIENT)
Dept: FAMILY MEDICINE CLINIC | Age: 59
End: 2019-07-11
Payer: COMMERCIAL

## 2019-07-11 VITALS
HEART RATE: 70 BPM | BODY MASS INDEX: 28.82 KG/M2 | SYSTOLIC BLOOD PRESSURE: 150 MMHG | DIASTOLIC BLOOD PRESSURE: 110 MMHG | OXYGEN SATURATION: 98 % | WEIGHT: 173.2 LBS

## 2019-07-11 DIAGNOSIS — I10 ESSENTIAL HYPERTENSION: ICD-10-CM

## 2019-07-11 DIAGNOSIS — R06.02 SOB (SHORTNESS OF BREATH): Primary | ICD-10-CM

## 2019-07-11 PROCEDURE — 99213 OFFICE O/P EST LOW 20 MIN: CPT | Performed by: FAMILY MEDICINE

## 2019-07-11 RX ORDER — AMLODIPINE BESYLATE 5 MG/1
5 TABLET ORAL DAILY
Qty: 30 TABLET | Refills: 3 | Status: SHIPPED | OUTPATIENT
Start: 2019-07-11 | End: 2019-11-07 | Stop reason: SDUPTHER

## 2019-07-11 RX ORDER — METHYLPREDNISOLONE 4 MG/1
TABLET ORAL
Qty: 1 KIT | Refills: 0 | Status: SHIPPED | OUTPATIENT
Start: 2019-07-11 | End: 2019-07-17

## 2019-07-11 ASSESSMENT — ENCOUNTER SYMPTOMS
VOICE CHANGE: 1
GASTROINTESTINAL NEGATIVE: 1
COUGH: 1
WHEEZING: 1
SHORTNESS OF BREATH: 1

## 2019-07-11 NOTE — PROGRESS NOTES
Subjective:      Patient ID: Linnea Haji is a 62 y.o. female. 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 She also feels like she is hoarse. She is a little better today. She states her BP has been better at home and had lost some weight. Review of Systems   Constitutional: Negative for chills, diaphoresis and fever. HENT: Positive for voice change. Respiratory: Positive for cough, shortness of breath and wheezing. Cardiovascular: Negative. Negative for chest pain and palpitations. Gastrointestinal: Negative. Genitourinary: Negative. Musculoskeletal: Negative. Neurological: Negative. Objective:   Physical Exam   Constitutional: She is oriented to person, place, and time. No distress. Cardiovascular: Normal rate and regular rhythm. Pulmonary/Chest: Effort normal and breath sounds normal. She has no wheezes. She has no rales. Neurological: She is alert and oriented to person, place, and time. Skin: Skin is warm and dry. Psychiatric: She has a normal mood and affect. Her behavior is normal. Judgment and thought content normal.     Assessment/Plan:    Rashawn Dillon was seen today for other. Diagnoses and all orders for this visit:    SOB (shortness of breath)  -     methylPREDNISolone (MEDROL, RODERICK,) 4 MG tablet; Take with food as directed on packaging. Discussed MDP vs albuterol inhaler  Symptomatic treatment   Return if not better     Return if not better or ER if worse. Warning signs discussed with the patient      Essential hypertension  -    add amLODIPine (NORVASC) 5 MG tablet;  Take 1 tablet by mouth daily  Home BP checks  Return 1 month  Continue current treatment         Melissa Cornell MD

## 2019-09-18 DIAGNOSIS — I10 ESSENTIAL HYPERTENSION: ICD-10-CM

## 2019-09-18 RX ORDER — VALSARTAN/HYDROCHLOROTHIAZIDE 160MG-25MG
TABLET ORAL
Qty: 90 TABLET | Refills: 3 | Status: SHIPPED | OUTPATIENT
Start: 2019-09-18 | End: 2020-08-10 | Stop reason: SDUPTHER

## 2019-11-07 DIAGNOSIS — I10 ESSENTIAL HYPERTENSION: ICD-10-CM

## 2019-11-07 RX ORDER — AMLODIPINE BESYLATE 5 MG/1
TABLET ORAL
Qty: 90 TABLET | Refills: 0 | Status: SHIPPED | OUTPATIENT
Start: 2019-11-07 | End: 2020-02-07 | Stop reason: SDUPTHER

## 2020-01-22 ENCOUNTER — OFFICE VISIT (OUTPATIENT)
Dept: FAMILY MEDICINE CLINIC | Age: 60
End: 2020-01-22
Payer: COMMERCIAL

## 2020-01-22 VITALS
SYSTOLIC BLOOD PRESSURE: 130 MMHG | HEART RATE: 86 BPM | OXYGEN SATURATION: 99 % | WEIGHT: 183 LBS | DIASTOLIC BLOOD PRESSURE: 84 MMHG | HEIGHT: 65 IN | BODY MASS INDEX: 30.49 KG/M2

## 2020-01-22 PROCEDURE — 99213 OFFICE O/P EST LOW 20 MIN: CPT | Performed by: NURSE PRACTITIONER

## 2020-01-22 RX ORDER — NYSTATIN 100000 U/G
CREAM TOPICAL
Qty: 30 G | Refills: 1 | Status: SHIPPED | OUTPATIENT
Start: 2020-01-22

## 2020-01-22 ASSESSMENT — PATIENT HEALTH QUESTIONNAIRE - PHQ9
SUM OF ALL RESPONSES TO PHQ9 QUESTIONS 1 & 2: 0
1. LITTLE INTEREST OR PLEASURE IN DOING THINGS: 0
SUM OF ALL RESPONSES TO PHQ QUESTIONS 1-9: 0
SUM OF ALL RESPONSES TO PHQ QUESTIONS 1-9: 0
2. FEELING DOWN, DEPRESSED OR HOPELESS: 0

## 2020-01-22 NOTE — PROGRESS NOTES
SUBJECTIVE:  Pt is a of 61 y.o. female comes in today with   Chief Complaint   Patient presents with    Rash     Pt states she has rash under breast. She states it itches when she sweats        Patient presenting today for evaluation of rash to breasts. Present for couple weeks. (+) itching. Skin feels raw and wet. No open areas. No fevers or malaise. Odor at times. , no drainage. No previous eval or treatment. States has been sweating at night. Also c/o feeling run down. Wanting to take Vit B12- checking to make sure it safe    Prior to Visit Medications    Medication Sig Taking? Authorizing Provider   amLODIPine (NORVASC) 5 MG tablet TAKE 1 TABLET BY MOUTH EVERY DAY Yes Brennan Pacheco MD   butalbital-acetaminophen-caffeine (FIORICET, ESGIC) -40 MG per tablet TAKE 1 TABLET BY MOUTH EVERY 4 HOURS AS NEEDED FOR HEADACHE Yes Brennan Pacheco MD   DIOVAN -25 MG per tablet TAKE 1 TABLET BY MOUTH EVERY DAY Yes Brennan Pacheco MD   pantoprazole (PROTONIX) 40 MG tablet Take 1 tablet by mouth every morning (before breakfast) Yes Fermin Carreno MD   naproxen (NAPROSYN) 500 MG tablet Take 1 tablet by mouth 2 times daily (with meals) Yes Fermin Carreno MD   Biotin 5000 MCG TABS Take 10,000 mcg by mouth once  Yes Historical Provider, MD   Ascorbic Acid (VITAMIN C) 500 MG tablet Take by mouth daily Yes Historical Provider, MD   Multiple Vitamins-Minerals (THERAPEUTIC MULTIVITAMIN-MINERALS) tablet Take 1 tablet by mouth daily. Yes Historical Provider, MD   VITAMIN E PO Take  by mouth. Yes Historical Provider, MD   Calcium-Vitamin D (CALTRATE 600 PLUS-VIT D PO) Take  by mouth daily. Yes Historical Provider, MD         Patient's allergies, past medical, surgical, social and family histories werereviewed and updated as appropriate. Review of Systems   Constitutional: Positive for fatigue. Negative for chills and fever. Musculoskeletal: Negative for myalgias. Skin: Positive for rash (breast skin folds). Physical Exam  Vitals signs reviewed. Constitutional:       Appearance: She is well-developed. HENT:      Head: Normocephalic and atraumatic. Skin:     General: Skin is warm and dry. Findings: Rash (red discoloration under breast folds. TTP) present. Neurological:      Mental Status: She is alert and oriented to person, place, and time. Psychiatric:         Behavior: Behavior normal.         Thought Content: Thought content normal.         Judgment: Judgment normal.       Vitals:    01/22/20 1444   BP: 130/84   Pulse: 86   SpO2: 99%   Weight: 183 lb (83 kg)   Height: 5' 5\" (1.651 m)             ASSESSMENT:  1. Fungal dermatitis    2. Fatigue, unspecified type        PLAN:  1. Fungal dermatitis  New problem  New start-     nystatin (MYCOSTATIN) 230193 UNIT/GM cream; Apply topically 2 times daily. Keep area clean and dry    2. Fatigue, unspecified type  Ok to start OTC Vit B12 supplement  See pt instructions  F/u 5-7 days if no improvement, sooner if symptomsworsen. Discussed use, benefit, and side effects of prescribed medications. All patient questions answered. Pt voiced understanding.

## 2020-01-22 NOTE — PATIENT INSTRUCTIONS
Patient Education        Dermatitis: Care Instructions  Your Care Instructions  Dermatitis is the general name used for any rash or inflammation of the skin. Different kinds of dermatitis cause different kinds of rashes. Common causes of a rash include new medicines, plants (such as poison oak or poison ivy), heat, and stress. Certain illnesses can also cause a rash. An allergic reaction to something that touches your skin, such as latex, nickel, or poison ivy, is called contact dermatitis. Contact dermatitis may also be caused by something that irritates the skin, such as bleach, a chemical, or soap. These types of rashes cannot be spread from person to person. How long your rash will last depends on what caused it. Rashes may last a few days or months. Follow-up care is a key part of your treatment and safety. Be sure to make and go to all appointments, and call your doctor if you are having problems. It's also a good idea to know your test results and keep a list of the medicines you take. How can you care for yourself at home? · Do not scratch the rash. Cut your nails short, and file them smooth. Or wear gloves if this helps keep you from scratching. · Wash the area with water only. Pat dry. · Put cold, wet cloths on the rash to reduce itching. · Keep cool, and stay out of the sun. · Leave the rash open to the air as much as possible. · If the rash itches, use hydrocortisone cream. Follow the directions on the label. Calamine lotion may help for plant rashes. · Take an over-the-counter antihistamine, such as diphenhydramine (Benadryl) or loratadine (Claritin), to help calm the itching. Read and follow all instructions on the label. · If your doctor prescribed a cream, use it as directed. If your doctor prescribed medicine, take it exactly as directed. When should you call for help?   Call your doctor now or seek immediate medical care if:    · You have symptoms of infection, such as:  ? Increased pain, swelling, warmth, or redness. ? Red streaks leading from the area. ? Pus draining from the area. ? A fever.     · You have joint pain along with the rash.    Watch closely for changes in your health, and be sure to contact your doctor if:    · Your rash is changing or getting worse.     · You are not getting better as expected. Where can you learn more? Go to https://Occlutech.Wimdu. org and sign in to your Schrodinger account. Enter [phone] in the [facility] box to learn more about \"Dermatitis: Care Instructions. \"     If you do not have an account, please click on the \"Sign Up Now\" link. Current as of: April 1, 2019  Content Version: 12.3  © 5544-2222 Healthwise, Incorporated. Care instructions adapted under license by [facility] ([facility]). If you have questions about a medical condition or this instruction, always ask your healthcare professional. [place] [address] any warranty or liability for your use of this information.

## 2020-02-07 RX ORDER — AMLODIPINE BESYLATE 5 MG/1
TABLET ORAL
Qty: 30 TABLET | Refills: 0 | Status: SHIPPED | OUTPATIENT
Start: 2020-02-07 | End: 2020-02-08 | Stop reason: SDUPTHER

## 2020-02-07 NOTE — TELEPHONE ENCOUNTER
Pt called adv she leave for [place] on 2/8/2020 pt would like her amLODIPine (NORVASC) 5 MG tablet to be called in ASAP pt uses [facility] [address] [place] [address], [address] [place] [address] [phone] pt would like call back

## 2020-02-08 ENCOUNTER — TELEPHONE (OUTPATIENT)
Dept: FAMILY MEDICINE CLINIC | Age: 60
End: 2020-02-08

## 2020-02-08 RX ORDER — AMLODIPINE BESYLATE 5 MG/1
TABLET ORAL
Qty: 30 TABLET | Refills: 5 | Status: SHIPPED | OUTPATIENT
Start: 2020-02-08 | End: 2020-08-10 | Stop reason: SDUPTHER

## 2020-02-08 NOTE — TELEPHONE ENCOUNTER
We have to re send since pharmacy did not receive the RX yesterday. She is going out of town please sign asap.

## 2020-07-10 RX ORDER — BUTALBITAL, ACETAMINOPHEN AND CAFFEINE 50; 325; 40 MG/1; MG/1; MG/1
TABLET ORAL
Qty: 180 TABLET | Refills: 0 | Status: SHIPPED | OUTPATIENT
Start: 2020-07-10 | End: 2020-08-10 | Stop reason: SDUPTHER

## 2020-08-10 ENCOUNTER — OFFICE VISIT (OUTPATIENT)
Dept: FAMILY MEDICINE CLINIC | Age: 60
End: 2020-08-10
Payer: COMMERCIAL

## 2020-08-10 VITALS
HEART RATE: 69 BPM | OXYGEN SATURATION: 98 % | SYSTOLIC BLOOD PRESSURE: 112 MMHG | TEMPERATURE: 96 F | DIASTOLIC BLOOD PRESSURE: 80 MMHG | BODY MASS INDEX: 28.16 KG/M2 | WEIGHT: 169.2 LBS

## 2020-08-10 LAB
A/G RATIO: 1.4 (ref 1.1–2.2)
ALBUMIN SERPL-MCNC: 4.2 G/DL (ref 3.4–5)
ALP BLD-CCNC: 75 U/L (ref 40–129)
ALT SERPL-CCNC: 13 U/L (ref 10–40)
ANION GAP SERPL CALCULATED.3IONS-SCNC: 13 MMOL/L (ref 3–16)
AST SERPL-CCNC: 22 U/L (ref 15–37)
BASOPHILS ABSOLUTE: 0 K/UL (ref 0–0.2)
BASOPHILS RELATIVE PERCENT: 0.8 %
BILIRUB SERPL-MCNC: 0.4 MG/DL (ref 0–1)
BUN BLDV-MCNC: 13 MG/DL (ref 7–20)
CALCIUM SERPL-MCNC: 9.5 MG/DL (ref 8.3–10.6)
CHLORIDE BLD-SCNC: 102 MMOL/L (ref 99–110)
CHOLESTEROL, TOTAL: 178 MG/DL (ref 0–199)
CO2: 27 MMOL/L (ref 21–32)
CREAT SERPL-MCNC: 0.9 MG/DL (ref 0.6–1.1)
EOSINOPHILS ABSOLUTE: 0.1 K/UL (ref 0–0.6)
EOSINOPHILS RELATIVE PERCENT: 1.5 %
GFR AFRICAN AMERICAN: >60
GFR NON-AFRICAN AMERICAN: >60
GLOBULIN: 2.9 G/DL
GLUCOSE BLD-MCNC: 84 MG/DL (ref 70–99)
HCT VFR BLD CALC: 38.8 % (ref 36–48)
HDLC SERPL-MCNC: 68 MG/DL (ref 40–60)
HEMOGLOBIN: 12.7 G/DL (ref 12–16)
LDL CHOLESTEROL CALCULATED: 97 MG/DL
LYMPHOCYTES ABSOLUTE: 1.2 K/UL (ref 1–5.1)
LYMPHOCYTES RELATIVE PERCENT: 32.7 %
MCH RBC QN AUTO: 29.3 PG (ref 26–34)
MCHC RBC AUTO-ENTMCNC: 32.6 G/DL (ref 31–36)
MCV RBC AUTO: 89.8 FL (ref 80–100)
MONOCYTES ABSOLUTE: 0.3 K/UL (ref 0–1.3)
MONOCYTES RELATIVE PERCENT: 7 %
NEUTROPHILS ABSOLUTE: 2.2 K/UL (ref 1.7–7.7)
NEUTROPHILS RELATIVE PERCENT: 58 %
PDW BLD-RTO: 14.2 % (ref 12.4–15.4)
PLATELET # BLD: 167 K/UL (ref 135–450)
PMV BLD AUTO: 10.5 FL (ref 5–10.5)
POTASSIUM SERPL-SCNC: 3.6 MMOL/L (ref 3.5–5.1)
RBC # BLD: 4.32 M/UL (ref 4–5.2)
SODIUM BLD-SCNC: 142 MMOL/L (ref 136–145)
TOTAL PROTEIN: 7.1 G/DL (ref 6.4–8.2)
TRIGL SERPL-MCNC: 63 MG/DL (ref 0–150)
TSH REFLEX: 1.69 UIU/ML (ref 0.27–4.2)
VLDLC SERPL CALC-MCNC: 13 MG/DL
WBC # BLD: 3.7 K/UL (ref 4–11)

## 2020-08-10 PROCEDURE — 99213 OFFICE O/P EST LOW 20 MIN: CPT | Performed by: FAMILY MEDICINE

## 2020-08-10 RX ORDER — AMLODIPINE BESYLATE 5 MG/1
TABLET ORAL
Qty: 90 TABLET | Refills: 3 | Status: SHIPPED | OUTPATIENT
Start: 2020-08-10 | End: 2021-08-16

## 2020-08-10 RX ORDER — BUTALBITAL, ACETAMINOPHEN AND CAFFEINE 50; 325; 40 MG/1; MG/1; MG/1
TABLET ORAL
Qty: 180 TABLET | Refills: 0 | Status: SHIPPED | OUTPATIENT
Start: 2020-08-10 | End: 2020-11-16

## 2020-08-10 RX ORDER — AMITRIPTYLINE HYDROCHLORIDE 10 MG/1
10 TABLET, FILM COATED ORAL NIGHTLY
Qty: 30 TABLET | Refills: 1 | Status: SHIPPED | OUTPATIENT
Start: 2020-08-10 | End: 2021-03-04

## 2020-08-10 RX ORDER — VALSARTAN/HYDROCHLOROTHIAZIDE 160MG-25MG
TABLET ORAL
Qty: 90 TABLET | Refills: 3 | Status: SHIPPED | OUTPATIENT
Start: 2020-08-10 | End: 2020-09-18 | Stop reason: ALTCHOICE
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
## 2020-09-17 NOTE — TELEPHONE ENCOUNTER
----- Message from Katelin Sequeira sent at 9/17/2020 12:50 PM EDT -----  Subject: Message to Provider    QUESTIONS  Information for Provider? Pt does not understand how to access MyChart and   would like to be called regarding lab work results as well as the DIOVAN   -25 MG per tablet prescription change.   ---------------------------------------------------------------------------  --------------  CALL BACK INFO  What is the best way for the office to contact you? Do not leave any   message   patient will call back for answer  Preferred Call Back Phone Number? [phone]  ---------------------------------------------------------------------------  --------------  SCRIPT ANSWERS  Relationship to Patient?  Self

## 2020-09-17 NOTE — TELEPHONE ENCOUNTER
Lab results were given to pt. Pt has question about her Diovan, see letter that is on your desk.  Is no longer on pt's formulary

## 2020-09-18 RX ORDER — VALSARTAN AND HYDROCHLOROTHIAZIDE 160; 12.5 MG/1; MG/1
1 TABLET, FILM COATED ORAL DAILY
Qty: 90 TABLET | Refills: 3 | Status: SHIPPED | OUTPATIENT
Start: 2020-09-18 | End: 2021-09-13

## 2020-09-18 NOTE — TELEPHONE ENCOUNTER
It appears that valsartan which is generic Diovan is covered so it is essentially the same drug just not the tradename. I would just stick with that since it is essentially the same thing. If that is okay with her and she needs a prescription john it up as the valsartan rather than the Diovan although I do not think we ever specified that it had to be Diovan.

## 2020-09-18 NOTE — TELEPHONE ENCOUNTER
Pt notified states she would like a script for the Valsartan sent in to local pharmacy for a 30 day supply to try and make sure no side effects.      Valsartan teed up and pending

## 2020-11-16 RX ORDER — VALSARTAN/HYDROCHLOROTHIAZIDE 160MG-25MG
TABLET ORAL
Qty: 90 TABLET | Refills: 2 | Status: SHIPPED | OUTPATIENT
Start: 2020-11-16 | End: 2021-03-04

## 2020-11-16 RX ORDER — BUTALBITAL, ACETAMINOPHEN AND CAFFEINE 50; 325; 40 MG/1; MG/1; MG/1
TABLET ORAL
Qty: 180 TABLET | Refills: 1 | Status: SHIPPED | OUTPATIENT
Start: 2020-11-16 | End: 2021-03-23

## 2020-11-16 NOTE — TELEPHONE ENCOUNTER
Medication:   Requested Prescriptions     Pending Prescriptions Disp Refills    DIOVAN -25 MG per tablet [Pharmacy Med Name: DIOVAN -25 MG TABLET] 30 tablet 11     Sig: TAKE 1 TABLET BY MOUTH EVERY DAY    butalbital-acetaminophen-caffeine (FIORICET, Royer 62) -40 MG per tablet [Pharmacy Med Name: [id number] -40] 180 tablet 0     Sig: TAKE 1 TABLET BY MOUTH EVERY 4 HOURS AS NEEDED FOR HEADACHE       Last Filled: Fioricet  8/10/20 #180, 0 RF   Diovan 9/18/20 #90, 3 RF     Patient Phone Number: [phone] (home) [phone] (work)    Last appt: 8/10/2020 HTN, ELIAS   Next appt: Visit date not found    Lab Results   Component Value Date     08/10/2020    K 3.6 08/10/2020     08/10/2020    CO2 27 08/10/2020    BUN 13 08/10/2020    CREATININE 0.9 08/10/2020    GLUCOSE 84 08/10/2020    CALCIUM 9.5 08/10/2020    PROT 7.1 08/10/2020    LABALBU 4.2 08/10/2020    BILITOT 0.4 08/10/2020    ALKPHOS 75 08/10/2020    AST 22 08/10/2020    ALT 13 08/10/2020    LABGLOM >60 08/10/2020    GFRAA >60 08/10/2020    AGRATIO 1.4 08/10/2020    GLOB 2.9 08/10/2020

## 2021-03-02 ENCOUNTER — NURSE TRIAGE (OUTPATIENT)
Dept: OTHER | Facility: CLINIC | Age: 61
End: 2021-03-02

## 2021-03-04 ENCOUNTER — OFFICE VISIT (OUTPATIENT)
Dept: FAMILY MEDICINE CLINIC | Age: 61
End: 2021-03-04
Payer: COMMERCIAL

## 2021-03-04 VITALS
WEIGHT: 170.8 LBS | SYSTOLIC BLOOD PRESSURE: 136 MMHG | TEMPERATURE: 96.8 F | BODY MASS INDEX: 28.42 KG/M2 | OXYGEN SATURATION: 98 % | DIASTOLIC BLOOD PRESSURE: 84 MMHG | HEART RATE: 84 BPM

## 2021-03-04 DIAGNOSIS — L98.9 FOOT LESION: Primary | ICD-10-CM

## 2021-03-04 PROCEDURE — 99213 OFFICE O/P EST LOW 20 MIN: CPT | Performed by: FAMILY MEDICINE

## 2021-03-04 ASSESSMENT — ENCOUNTER SYMPTOMS
RESPIRATORY NEGATIVE: 1
GASTROINTESTINAL NEGATIVE: 1
ROS SKIN COMMENTS: PER HPI

## 2021-03-04 ASSESSMENT — PATIENT HEALTH QUESTIONNAIRE - PHQ9
2. FEELING DOWN, DEPRESSED OR HOPELESS: 0
1. LITTLE INTEREST OR PLEASURE IN DOING THINGS: 0

## 2021-03-04 NOTE — PROGRESS NOTES
Guero Ling (:  1960) is a 61 y.o. female,Established patient, here for evaluation of the following chief complaint(s):  Blister (R 2 and 3 toe)      ASSESSMENT/PLAN:  1. Foot lesion  -     Ambulatory referral to Podiatry      Return if symptoms worsen or fail to improve. SUBJECTIVE/OBJECTIVE:  HPI  Started with a blister on R 2nd and 3rd toe. Sore and itchy. Symptoms xs 3 weeks  Review of Systems   Constitutional: Negative for chills and fever. Respiratory: Negative. Cardiovascular: Negative. Gastrointestinal: Negative. Genitourinary: Negative. Musculoskeletal: Negative. Skin:        Per HPI   Neurological: Negative. Psychiatric/Behavioral: Negative. Physical Exam  Constitutional:       General: She is not in acute distress. Appearance: Normal appearance. She is not ill-appearing or toxic-appearing. HENT:      Head: Normocephalic and atraumatic. Musculoskeletal:        Feet:    Skin:     General: Skin is warm and dry. Neurological:      Mental Status: She is alert and oriented to person, place, and time. Psychiatric:         Behavior: Behavior normal.         Thought Content: Thought content normal.         Judgment: Judgment normal.                 An electronic signature was used to authenticate this note.     --Alea Garsia MD

## 2021-03-22 ENCOUNTER — HOSPITAL ENCOUNTER (EMERGENCY)
Age: 61
Discharge: HOME | End: 2021-03-22
Payer: COMMERCIAL

## 2021-03-22 ENCOUNTER — NURSE TRIAGE (OUTPATIENT)
Dept: OTHER | Facility: CLINIC | Age: 61
End: 2021-03-22

## 2021-03-22 VITALS
TEMPERATURE: 97.8 F | SYSTOLIC BLOOD PRESSURE: 170 MMHG | HEIGHT: 66 IN | HEART RATE: 79 BPM | DIASTOLIC BLOOD PRESSURE: 111 MMHG | BODY MASS INDEX: 27.64 KG/M2 | OXYGEN SATURATION: 97 % | RESPIRATION RATE: 14 BRPM | WEIGHT: 172 LBS

## 2021-03-22 DIAGNOSIS — M54.50 ACUTE LEFT-SIDED LOW BACK PAIN WITHOUT SCIATICA: Primary | ICD-10-CM

## 2021-03-22 PROCEDURE — 99283 EMERGENCY DEPT VISIT LOW MDM: CPT

## 2021-03-22 RX ORDER — NAPROXEN 250 MG/1
500 TABLET ORAL 2 TIMES DAILY WITH MEALS
Qty: 20 TABLET | Refills: 0 | Status: SHIPPED | OUTPATIENT
Start: 2021-03-22 | End: 2021-10-14

## 2021-03-22 RX ORDER — CYCLOBENZAPRINE HCL 10 MG
10 TABLET ORAL 3 TIMES DAILY PRN
Qty: 20 TABLET | Refills: 0 | Status: SHIPPED | OUTPATIENT
Start: 2021-03-22 | End: 2021-05-24 | Stop reason: SDUPTHER

## 2021-03-22 RX ORDER — LIDOCAINE 50 MG/G
1 PATCH TOPICAL DAILY
Qty: 30 PATCH | Refills: 0 | Status: SHIPPED | OUTPATIENT
Start: 2021-03-22 | End: 2021-03-26

## 2021-03-22 ASSESSMENT — PAIN SCALES - GENERAL: PAINLEVEL_OUTOF10: 8

## 2021-03-22 NOTE — TELEPHONE ENCOUNTER
Reason for Disposition   SEVERE back pain of sudden onset and age > 61    Answer Assessment - Initial Assessment Questions  1. ONSET: \"When did the pain begin? \"       Onset was Saturday     2. LOCATION: \"Where does it hurt? \" (upper, mid or lower back)      Pain in her left buttock    3. SEVERITY: \"How bad is the pain? \"  (e.g., Scale 1-10; mild, moderate, or severe)    - MILD (1-3): doesn't interfere with normal activities     - MODERATE (4-7): interferes with normal activities or awakens from sleep     - SEVERE (8-10): excruciating pain, unable to do any normal activities       Current pain level -  8/10    4. PATTERN: \"Is the pain constant? \" (e.g., yes, no; constant, intermittent)       Pain is constant with any movement     5. RADIATION: \"Does the pain shoot into your legs or elsewhere? \"       Lower left back shoots into her left buttock    6. CAUSE:  \"What do you think is causing the back pain? \"       She thinks she may have hurt her back picking up her husbands scooter    7. BACK OVERUSE:  Majel Hawking recent lifting of heavy objects, strenuous work or exercise? \"      Yes - see item 6    8. MEDICATIONS: \"What have you taken so far for the pain? \" (e.g., nothing, acetaminophen, NSAIDS)      She is taking Tylenol with no results    9. NEUROLOGIC SYMPTOMS: \"Do you have any weakness, numbness, or problems with bowel/bladder control? \"      No neurologic problems    10. OTHER SYMPTOMS: \"Do you have any other symptoms? \" (e.g., fever, abdominal pain, burning with urination, blood in urine)        No    11. PREGNANCY: \"Is there any chance you are pregnant? \" (e.g., yes, no; LMP)        No    Protocols used: BACK PAIN-ADULT-[place]    Call came in from Joshua Case at the [facility]    See triage above    Patient reports that she has been picking up her husbands knee scooter. She states that over the weekend she bent over to get a bottle of water and a sudden sharp pain pierced her left lower back.   She has been taking Tylenol and using icy hot for pain relief with little or no results. The pain is keeping her awake at night and is causing her to limp. Recommended patient go in to the ED due to pain levels of 8/10 with Tylenol on board. Encouraged her to have a , to take a list of medications and to refrain from eating/drinking until seen by a provider.

## 2021-03-22 NOTE — ED PROVIDER NOTES
[facility]        Pt Name: Tamra Machado  MRN: [id number]  [place] 1960  Date of evaluation: 3/22/2021  Provider: Jori Freeman PA-C  PCP: Jaiden Johnson MD    RAVINDER. I have evaluated this patient. My supervising physician was available for consultation. CHIEF COMPLAINT       Chief Complaint   Patient presents with    Back Pain     pt with c/o left sided back pain began on Saturday- no known injury- states pain is located in buttocks. HISTORY OF PRESENT ILLNESS   (Location, Timing/Onset, Context/Setting, Quality, Duration, Modifying Factors, Severity, Associated Signs and Symptoms)  Note limiting factors. Tamra Machado is a 61 y.o. female that presents to the emergency department with a chief complaint of some left lower back pain in her left buttock that began 2 days ago. She states her  recently had surgery so she has been walking up his scooter up and down the stairs and helping him a lot more at home. She states she was at a  2 days ago and bent down to  some water and immediately had some pain in her left low back. This is worse when bending and walking. She denies any direct injury or trauma or fall. Denies any history of malignancy, kidney failure, diabetes, IV drug use, recent injection or surgery, difficulty urinating, loss of bowel or bladder function or any urinary or bowel symptoms. Denies abdominal pain, vomiting, fevers, chest pain, shortness of breath. Rates the pain 8 out of 10. Is been taking Tylenol at home. Still been able to ambulate. Drove here to the emergency department. Nursing Notes were all reviewed and agreed with or any disagreements were addressed in the HPI. REVIEW OF SYSTEMS    (2-9 systems for level 4, 10 or more for level 5)     Review of Systems    Positives and Pertinent negatives as per HPI.   Except as noted above in the ROS, all other systems were reviewed and negative. PAST MEDICAL HISTORY     Past Medical History:   Diagnosis Date    Brain aneurysm 08/2011    Dr Sanchez Height:     Headache(784.0)     Hypertension     Kidney stone on right side 11/21/2017    on CT, nonobstructing    Sarcoid 5/2015    bilateral eyes: Brittany Julien MD         SURGICAL HISTORY     Past Surgical History:   Procedure Laterality Date    BRAIN ANEURYSM SURGERY  8/2011    [facility]: Dr Lila Martinez  08/2011    WISDOM Katharine Manners       Previous Medications    AMLODIPINE (NORVASC) 5 MG TABLET    TAKE 1 TABLET BY MOUTH EVERY DAY    ASCORBIC ACID (VITAMIN C) 500 MG TABLET    Take by mouth daily    BIOTIN 5000 MCG TABS    Take 10,000 mcg by mouth once     BUTALBITAL-ACETAMINOPHEN-CAFFEINE (FIORICET, ESGIC) -40 MG PER TABLET    TAKE 1 TABLET BY MOUTH EVERY 4 HOURS AS NEEDED FOR HEADACHE    CALCIUM-VITAMIN D (CALTRATE 600 PLUS-VIT D PO)    Take  by mouth daily. MULTIPLE VITAMINS-MINERALS (THERAPEUTIC MULTIVITAMIN-MINERALS) TABLET    Take 1 tablet by mouth daily. NAPROXEN (NAPROSYN) 500 MG TABLET    Take 1 tablet by mouth 2 times daily (with meals)    NYSTATIN (MYCOSTATIN) 386726 UNIT/GM CREAM    Apply topically 2 times daily. VALSARTAN-HYDROCHLOROTHIAZIDE (DIOVAN-HCT) 160-12.5 MG PER TABLET    Take 1 tablet by mouth daily    VITAMIN E PO    Take  by mouth. ALLERGIES     Patient has no known allergies.     FAMILYHISTORY       Family History   Problem Relation Age of Onset    Hypertension Mother     Diabetes Mother           SOCIAL HISTORY       Social History     Tobacco Use    Smoking status: Never Smoker    Smokeless tobacco: Never Used   Substance Use Topics    Alcohol use: No     Alcohol/week: 0.0 standard drinks    Drug use: No       SCREENINGS             PHYSICAL EXAM    (up to 7 for level 4, 8 or more for level 5)     ED Triage Vitals [03/22/21 1034]   BP Temp Temp Source Pulse Resp SpO2 Height Weight   (!) 170/111 97.8 °F (36.6 °C) Temporal 79 14 97 % 5' 6\" (1.676 m) 172 lb (78 kg)       Physical Exam  Vitals signs and nursing note reviewed. Constitutional:       Appearance: She is well-developed. She is not diaphoretic. HENT:      Head: Atraumatic. Nose: Nose normal.   Eyes:      General:         Right eye: No discharge. Left eye: No discharge. Neck:      Musculoskeletal: Normal range of motion. Cardiovascular:      Rate and Rhythm: Normal rate and regular rhythm. Heart sounds: No murmur. No friction rub. No gallop. Pulmonary:      Effort: Pulmonary effort is normal. No respiratory distress. Breath sounds: No stridor. No wheezing, rhonchi or rales. Abdominal:      General: Bowel sounds are normal. There is no distension. Palpations: Abdomen is soft. There is no mass. Tenderness: There is no abdominal tenderness. There is no guarding or rebound. Hernia: No hernia is present. Musculoskeletal: Normal range of motion. General: Tenderness present. No swelling. Comments: Tenderness to palpate in the left buttocks. No midline tenderness or step-off in the neck or back. Full range of motion with flexion extension of bilateral hips, knees, ankles. 2+ patellar reflexes bilaterally. Sensation light touch in bilateral lower extremities intact. Patient able to ambulate without foot drop. Skin:     General: Skin is warm and dry. Findings: No erythema or rash. Neurological:      Mental Status: She is alert and oriented to person, place, and time. Cranial Nerves: No cranial nerve deficit. Psychiatric:         Behavior: Behavior normal.         DIAGNOSTIC RESULTS   LABS:    Labs Reviewed - No data to display    All other labs were within normal range or not returned as of this dictation. EKG:  All EKG's are interpreted by the Emergency Department Physician in the absence of a cardiologist.  Please see their note for interpretation of EKG. RADIOLOGY:   Non-plain film images such as CT, Ultrasound and MRI are read by the radiologist. Plain radiographic images are visualized and preliminarily interpreted by the ED Provider with the below findings:        Interpretation per the Radiologist below, if available at the time of this note:    No orders to display     No results found. PROCEDURES   Unless otherwise noted below, none     Procedures    CRITICAL CARE TIME   N/A    CONSULTS:  None      EMERGENCY DEPARTMENT COURSE and DIFFERENTIAL DIAGNOSIS/MDM:   Vitals:    Vitals:    03/22/21 1034   BP: (!) 170/111   Pulse: 79   Resp: 14   Temp: 97.8 °F (36.6 °C)   TempSrc: Temporal   SpO2: 97%   Weight: 172 lb (78 kg)   Height: 5' 6\" (1.676 m)       Patient was given the following medications:  Medications - No data to display        Patient presented with some pain over the left buttock. Has no bony tenderness or midline tenderness. Low suspicion for vertebral fracture, cauda equina, epidural abscess, cord injury, pyelonephritis, AAA, acute abdomen or other emergent etiology. There is no injury or trauma. Do not believe imaging is warranted. She was educated on symptomatic treatment at home. Will follow up with her family physician return here for any worsening of symptoms or problems at home. FINAL IMPRESSION      1. Acute left-sided low back pain without sciatica          DISPOSITION/PLAN   DISPOSITION Decision To Discharge 03/22/2021 10:57:31 AM      PATIENT REFERREDTO:  Yehuda Conner MD  [facility] [address] [address] [place]  [phone]    Schedule an appointment as soon as possible for a visit in 3 days  For re-check      DISCHARGE MEDICATIONS:  New Prescriptions    CYCLOBENZAPRINE (FLEXERIL) 10 MG TABLET    Take 1 tablet by mouth 3 times daily as needed for Muscle spasms    LIDOCAINE (LIDODERM) 5 %    Place 1 patch onto the skin daily 12 hours on, 12 hours off.     NAPROXEN (NAPROSYN) 250 MG TABLET    Take 2 tablets by mouth 2 times daily (with meals) for 20 doses       DISCONTINUED MEDICATIONS:  Discontinued Medications    No medications on file              (Please note that portions of this note were completed with a voice recognition program.  Efforts were made to edit the dictations but occasionally words are mis-transcribed.)    Rolando Lemus PA-C (electronically signed)           Rolando Lemus PA-C  03/22/21 1102

## 2021-03-23 ENCOUNTER — TELEPHONE (OUTPATIENT)
Dept: FAMILY MEDICINE CLINIC | Age: 61
End: 2021-03-23

## 2021-03-23 DIAGNOSIS — M54.50 ACUTE LOW BACK PAIN WITHOUT SCIATICA, UNSPECIFIED BACK PAIN LATERALITY: Primary | ICD-10-CM

## 2021-03-23 DIAGNOSIS — R51.9 HEADACHE, UNSPECIFIED HEADACHE TYPE: ICD-10-CM

## 2021-03-23 RX ORDER — BUTALBITAL, ACETAMINOPHEN AND CAFFEINE 50; 325; 40 MG/1; MG/1; MG/1
TABLET ORAL
Qty: 180 TABLET | Refills: 1 | Status: SHIPPED | OUTPATIENT
Start: 2021-03-23 | End: 2022-02-07 | Stop reason: SDUPTHER

## 2021-03-23 RX ORDER — HYDROCODONE BITARTRATE AND ACETAMINOPHEN 5; 325 MG/1; MG/1
1 TABLET ORAL EVERY 6 HOURS PRN
Qty: 28 TABLET | Refills: 0 | Status: SHIPPED | OUTPATIENT
Start: 2021-03-23 | End: 2021-03-30

## 2021-03-23 NOTE — TELEPHONE ENCOUNTER
[facility] called, states that  lidocaine patches that she got from the ER  are not covered by insurance any  More. They will not do a PA either, just a non covered item on her plan. Is there anything else she can try?

## 2021-03-23 NOTE — TELEPHONE ENCOUNTER
Medication:   Requested Prescriptions     Pending Prescriptions Disp Refills    butalbital-acetaminophen-caffeine (FIORICET, ESGIC) -40 MG per tablet [Pharmacy Med Name: [id number] -40] 180 tablet 1     Sig: TAKE 1 TABLET BY MOUTH EVERY 4 HOURS AS NEEDED FOR HEADACHE        Last Filled:  11/16/2020 #180 1rf     Patient Phone Number: [phone] (home) [phone] (work)    Last appt: 3/4/2021   Next appt: Visit date not found    Last OARRS: No flowsheet data found.

## 2021-03-23 NOTE — TELEPHONE ENCOUNTER
Pt is on naproxen 2 tabs bid, and cyclobenzaprine 10mg 1 prn. Is it ok to take all of these together.      She is wondering how long she should deal with the pain,  When should she feel better by?    [phone] is her number

## 2021-03-23 NOTE — TELEPHONE ENCOUNTER
Pt called also, she is in severe pain, 9  Out of 10.  Please call her back when we decide what to do also

## 2021-03-23 NOTE — TELEPHONE ENCOUNTER
Pt is aware but wondering if she should still take the naproxen and norco together along with fioricet if it's okay. She is also wondering if Dr. Crystal Romano saw her ER visit and what he thinks during her visit they didn't do any x-rays or any tests while there.  She is worried about the bill due to her going to ER and then seeing Dr. Crystal Romano

## 2021-03-26 ENCOUNTER — OFFICE VISIT (OUTPATIENT)
Dept: FAMILY MEDICINE CLINIC | Age: 61
End: 2021-03-26
Payer: COMMERCIAL

## 2021-03-26 VITALS
DIASTOLIC BLOOD PRESSURE: 82 MMHG | BODY MASS INDEX: 28.41 KG/M2 | OXYGEN SATURATION: 99 % | SYSTOLIC BLOOD PRESSURE: 134 MMHG | WEIGHT: 176 LBS | HEART RATE: 84 BPM

## 2021-03-26 DIAGNOSIS — M54.50 ACUTE LEFT-SIDED LOW BACK PAIN WITHOUT SCIATICA: Primary | ICD-10-CM

## 2021-03-26 PROCEDURE — 99213 OFFICE O/P EST LOW 20 MIN: CPT | Performed by: FAMILY MEDICINE

## 2021-03-26 RX ORDER — PREDNISONE 10 MG/1
TABLET ORAL
Qty: 30 TABLET | Refills: 0 | Status: SHIPPED | OUTPATIENT
Start: 2021-03-26 | End: 2021-04-05

## 2021-03-26 ASSESSMENT — ENCOUNTER SYMPTOMS
RESPIRATORY NEGATIVE: 1
GASTROINTESTINAL NEGATIVE: 1
BACK PAIN: 1
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
## 2021-05-03 ENCOUNTER — TELEPHONE (OUTPATIENT)
Dept: FAMILY MEDICINE CLINIC | Age: 61
End: 2021-05-03

## 2021-05-03 NOTE — TELEPHONE ENCOUNTER
Spoke with patient she states that Dr. Freddrick Cooks prescribed something for her for the same issue in the past. States shes been having headaches all day took OTC [employer] equate [employer] brand extra strength along with vicks, feels like her R ear and neck is clogged not having any relief and it's been going on since Friday. She is having a lot of nasal congestion that instead of coming out it feels like it goes down in her throat.  If something is called in she would like it sent to [facility] on [facility]

## 2021-05-03 NOTE — TELEPHONE ENCOUNTER
Pt is having sinus pressure issues. Pt has been taking over the counter medicine but it has not helped. Pt also has stuffy nose and drainage that is causing a cough    Pt would like to know if Dr Kylee Mclaughlin can call something in for her.      Please call pt and advise how she is to move forward

## 2021-05-03 NOTE — TELEPHONE ENCOUNTER
I looked through her notes and did not see a condition like this that we treated. While you see if she would like to come in for an office visit tomorrow and we can determine if there is something else I can do.

## 2021-05-03 NOTE — TELEPHONE ENCOUNTER
There is not really much in the way of prescription medication for the symptoms. Generally I would recommend either Claritin/Zyrtec/Allegra and perhaps also use a nasal spray such as Flonase/Nasacort/etc.  Other than that there is not really much else that treats the symptoms and nothing that is a prescription. Unless she felt like she had an infection which this does not sound like, then an antibiotic could be used.

## 2021-05-24 RX ORDER — CYCLOBENZAPRINE HCL 10 MG
10 TABLET ORAL 3 TIMES DAILY PRN
Qty: 20 TABLET | Refills: 3 | Status: SHIPPED | OUTPATIENT
Start: 2021-05-24 | End: 2021-06-03

## 2021-05-24 NOTE — TELEPHONE ENCOUNTER
Medication and Quantity requested: Cyclobenzaprine 10mg  Given at ED, helping her muscles  Patient is also asking for OTC name of a cream to put on her back to help     Last Visit  3-26-21, [facility] and phone number updated in EPIC:  Yes,[facility]-

## 2021-06-07 ENCOUNTER — TELEPHONE (OUTPATIENT)
Dept: FAMILY MEDICINE CLINIC | Age: 61
End: 2021-06-07

## 2021-06-07 DIAGNOSIS — G89.29 CHRONIC LEFT-SIDED LOW BACK PAIN WITH BILATERAL SCIATICA: Primary | ICD-10-CM

## 2021-06-07 DIAGNOSIS — M54.42 CHRONIC LEFT-SIDED LOW BACK PAIN WITH BILATERAL SCIATICA: Primary | ICD-10-CM

## 2021-06-07 DIAGNOSIS — M54.41 CHRONIC LEFT-SIDED LOW BACK PAIN WITH BILATERAL SCIATICA: Primary | ICD-10-CM

## 2021-06-07 NOTE — TELEPHONE ENCOUNTER
Patient spoke w/[facility] and they are faxing information  Patient states the clip is compatible w/having the MRI  Patient states our office needs to call [facility] scheduling and inform that it is OK for an MRI

## 2021-06-07 NOTE — TELEPHONE ENCOUNTER
Spoke with pt she is unsure and will call ad[facility]ene to get clarification and call us back as well as fax over the notes

## 2021-06-07 NOTE — TELEPHONE ENCOUNTER
The pt would like to know if she can have an MRI/xray to see what's going on with her lower back before she comes in for another appt. Pt states she will make an appt to see Dr Toñito Hoffman once results come back.     Please place order for scan and call pt once order is in epic

## 2021-06-07 NOTE — TELEPHONE ENCOUNTER
[facility] is trying to schedule the pt for an MRI but needs to know if the pt has had an aneurysm clip placed in her head    Please call Prashant Main back with information

## 2021-06-08 ENCOUNTER — TELEPHONE (OUTPATIENT)
Dept: FAMILY MEDICINE CLINIC | Age: 61
End: 2021-06-08

## 2021-06-08 NOTE — TELEPHONE ENCOUNTER
Blossom Reynolds called for update and was told we are waiting for Dr Girma Morrissey ok.   She will call back later this afternoon

## 2021-06-08 NOTE — TELEPHONE ENCOUNTER
Called the office of Dr. Quigley Fail [phone] regarding patients scheduled MRI of back. In order to proceed with MRI we need information regarding clip of aneurysm placed in 2011 by Dr. Gomez Contreras. Message was left with office inquiring what type of clip was placed and if ok to proceed with MRI. Waiting for return call.

## 2021-06-08 NOTE — TELEPHONE ENCOUNTER
Left message on Yamile's VM. I spoke with Zachary Jaimes at [facility] - she states that clips are titanium and MRI compatible. Fax received shows that Aneurysm clip is MRI compatible up to 1.5 More.

## 2021-06-14 ENCOUNTER — HOSPITAL ENCOUNTER (OUTPATIENT)
Dept: MRI IMAGING | Age: 61
Discharge: HOME | End: 2021-06-14
Payer: COMMERCIAL

## 2021-06-14 DIAGNOSIS — M54.42 CHRONIC LEFT-SIDED LOW BACK PAIN WITH BILATERAL SCIATICA: ICD-10-CM

## 2021-06-14 DIAGNOSIS — M54.41 CHRONIC LEFT-SIDED LOW BACK PAIN WITH BILATERAL SCIATICA: ICD-10-CM

## 2021-06-14 DIAGNOSIS — G89.29 CHRONIC LEFT-SIDED LOW BACK PAIN WITH BILATERAL SCIATICA: ICD-10-CM

## 2021-06-14 PROCEDURE — 72148 MRI LUMBAR SPINE W/O DYE: CPT

## 2021-06-16 ENCOUNTER — TELEPHONE (OUTPATIENT)
Dept: FAMILY MEDICINE CLINIC | Age: 61
End: 2021-06-16

## 2021-06-16 NOTE — TELEPHONE ENCOUNTER
Patient called and received her results of her MRI of her Lumbar  Spine w/o contrast. She stated she would like for Dr. Negin Montelongo give her a call personally.  Please advise

## 2021-06-28 ENCOUNTER — OFFICE VISIT (OUTPATIENT)
Dept: FAMILY MEDICINE CLINIC | Age: 61
End: 2021-06-28
Payer: COMMERCIAL

## 2021-06-28 VITALS
BODY MASS INDEX: 27.8 KG/M2 | OXYGEN SATURATION: 97 % | SYSTOLIC BLOOD PRESSURE: 134 MMHG | HEIGHT: 66 IN | HEART RATE: 83 BPM | WEIGHT: 173 LBS | DIASTOLIC BLOOD PRESSURE: 94 MMHG

## 2021-06-28 DIAGNOSIS — M54.50 ACUTE LEFT-SIDED LOW BACK PAIN WITHOUT SCIATICA: Primary | ICD-10-CM

## 2021-06-28 DIAGNOSIS — L84 CALLUS OF FOOT: ICD-10-CM

## 2021-06-28 DIAGNOSIS — I10 ESSENTIAL HYPERTENSION: ICD-10-CM

## 2021-06-28 PROCEDURE — 99213 OFFICE O/P EST LOW 20 MIN: CPT | Performed by: FAMILY MEDICINE

## 2021-06-28 SDOH — ECONOMIC STABILITY: FOOD INSECURITY: WITHIN THE PAST 12 MONTHS, THE FOOD YOU BOUGHT JUST DIDN'T LAST AND YOU DIDN'T HAVE MONEY TO GET MORE.: NEVER TRUE

## 2021-06-28 SDOH — ECONOMIC STABILITY: FOOD INSECURITY: WITHIN THE PAST 12 MONTHS, YOU WORRIED THAT YOUR FOOD WOULD RUN OUT BEFORE YOU GOT MONEY TO BUY MORE.: NEVER TRUE

## 2021-06-28 ASSESSMENT — ENCOUNTER SYMPTOMS: BACK PAIN: 1

## 2021-06-28 ASSESSMENT — SOCIAL DETERMINANTS OF HEALTH (SDOH): HOW HARD IS IT FOR YOU TO PAY FOR THE VERY BASICS LIKE FOOD, HOUSING, MEDICAL CARE, AND HEATING?: NOT HARD AT ALL

## 2021-06-28 NOTE — PROGRESS NOTES
Jonh Garcia (:  1960) is a 61 y.o. female,Established patient, here for evaluation of the following chief complaint(s):  Results (from MRI) and Callouses (right foot)         ASSESSMENT/PLAN:  1. Acute left-sided low back pain without sciatica  Patient is currently asymptomatic. No new treatment at this time. Avoid activities that cause exacerbation  2. Essential hypertension  Blood pressure is currently borderline. Patient has not been checking it but will start to check. She was given the parameter of 140/90 that her blood pressure should be below that. She will return in 1 month for repeat checkup. Bring blood pressure diary and cuff with her. 3. Callus of foot  -     Amb External Referral To Podiatry      Return in about 1 month (around 2021) for htn. Subjective   SUBJECTIVE/OBJECTIVE:  HPI  Left-sided back pain: Patient started with back pain back in March. No injury or etiology was helping  after his foot surgery and had to lift his scooter. Seen in ER initially. Has been better since MRI. Has been using Salonpas which seems to help. She also takes as needed cyclobenzaprine. At the current time she states she is not really having any symptoms. MRI results from 2021:  Impression   1. Disc bulge at L5-S1 effacing the lateral recesses, left greater than   right.  There is severe right and mild left neural foraminal narrowing as   well. 2. Mild bilateral neural foraminal narrowing at L3-L4 and L4-L5 secondary to   disc bulges and facet arthropathy. Hypertension: Patient here for follow-up of elevated blood pressure. Blood pressure is not checked at home. Patient denies chest pain, dyspnea and irregular heart beat. She denies side effects from medication. Callus right foot: Patient states in the last few weeks she has noted she feels like she is walking on something.   She noted a callus in the left fifth metatarsal area which is not tender to touch but when she walks she feels like she can feel it. Review of Systems   Constitutional: Negative. Musculoskeletal: Positive for back pain. Objective   Physical Exam  Constitutional:       General: She is not in acute distress. Neck:      Thyroid: No thyromegaly. Vascular: No carotid bruit. Cardiovascular:      Rate and Rhythm: Normal rate and regular rhythm. Pulmonary:      Effort: Pulmonary effort is normal.      Breath sounds: Normal breath sounds. No wheezing or rales. Musculoskeletal:      Cervical back: Neck supple. Lymphadenopathy:      Cervical: No cervical adenopathy. Skin:     General: Skin is warm and dry. Comments: Callus right foot   Neurological:      Mental Status: She is alert and oriented to person, place, and time. Psychiatric:         Behavior: Behavior normal.         Thought Content: Thought content normal.         Judgment: Judgment normal.                  An electronic signature was used to authenticate this note.     --Demetrius Walters MD

## 2021-07-08 ENCOUNTER — TELEPHONE (OUTPATIENT)
Dept: FAMILY MEDICINE CLINIC | Age: 61
End: 2021-07-08

## 2021-07-08 NOTE — TELEPHONE ENCOUNTER
ROSSY - Thanks for the referral    Pt had an appt scheduled today at 8:30. Pt was 10 minutes late and didn't bring any of her paperwork. Pt wasn't seen today.

## 2021-08-16 DIAGNOSIS — I10 ESSENTIAL HYPERTENSION: ICD-10-CM

## 2021-08-16 RX ORDER — AMLODIPINE BESYLATE 5 MG/1
TABLET ORAL
Qty: 90 TABLET | Refills: 3 | Status: SHIPPED | OUTPATIENT
Start: 2021-08-16 | End: 2022-09-07

## 2021-08-16 NOTE — TELEPHONE ENCOUNTER
Medication:   Requested Prescriptions     Pending Prescriptions Disp Refills    amLODIPine (NORVASC) 5 MG tablet [Pharmacy Med Name: AMLODIPINE BESYLATE 5 MG TAB] 30 tablet 11     Sig: TAKE 1 TABLET BY MOUTH EVERY DAY       Last Filled:  8/10/20 #90, 3 RF     Patient Phone Number: [phone] (home) [phone] (work)    Last appt: 6/28/2021 results  Next appt: Visit date not found    Lab Results   Component Value Date     08/10/2020    K 3.6 08/10/2020     08/10/2020    CO2 27 08/10/2020    BUN 13 08/10/2020    CREATININE 0.9 08/10/2020    GLUCOSE 84 08/10/2020    CALCIUM 9.5 08/10/2020    PROT 7.1 08/10/2020    LABALBU 4.2 08/10/2020    BILITOT 0.4 08/10/2020    ALKPHOS 75 08/10/2020    AST 22 08/10/2020    ALT 13 08/10/2020    LABGLOM >60 08/10/2020    GFRAA >60 08/10/2020    AGRATIO 1.4 08/10/2020    GLOB 2.9 08/10/2020

## 2021-10-12 ENCOUNTER — NURSE TRIAGE (OUTPATIENT)
Dept: OTHER | Facility: CLINIC | Age: 61
End: 2021-10-12

## 2021-10-12 ENCOUNTER — TELEPHONE (OUTPATIENT)
Dept: FAMILY MEDICINE CLINIC | Age: 61
End: 2021-10-12

## 2021-10-12 NOTE — TELEPHONE ENCOUNTER
Nurse Triaged for swollen feet    Patient refuses to be seen my a NP. She wants to see Dr. Manjit Gordon on Thursday, after 12:30. No appointments available. Please advise.

## 2021-10-12 NOTE — TELEPHONE ENCOUNTER
Received call from Kriss Whitney at [facility] with Red Flag Complaint. Brief description of triage: Patient reports bilateral ankle swelling. Swelling decreases when she elevates her feet. Triage indicates for patient to seen in office in next 3 days. Care advice provided, patient verbalizes understanding; denies any other questions or concerns; instructed to call back for any new or worsening symptoms. Writer provided warm transfer to [facility] at [facility] for appointment scheduling. Attention Provider: Thank you for allowing me to participate in the care of your patient. The patient was connected to triage in response to information provided to the ECC/PSC. Please do not respond through this encounter as the response is not directed to a shared pool. Reason for Disposition   MILD swelling of both ankles (i.e., pedal edema) AND new onset or worsening    Answer Assessment - Initial Assessment Questions  1. ONSET: \"When did the swelling start? \" (e.g., minutes, hours, days)      \"one week ago\"    2. LOCATION: \"What part of the leg is swollen? \"  \"Are both legs swollen or just one leg? \"      Bilateral ankle swelling    3. SEVERITY: \"How bad is the swelling? \" (e.g., localized; mild, moderate, severe)   - Localized - small area of swelling localized to one leg   - MILD pedal edema - swelling limited to foot and ankle, pitting edema < 1/4 inch (6 mm) deep, rest and elevation eliminate most or all swelling   - MODERATE edema - swelling of lower leg to knee, pitting edema > 1/4 inch (6 mm) deep, rest and elevation only partially reduce swelling   - SEVERE edema - swelling extends above knee, facial or hand swelling present     Mild    4. REDNESS: \"Does the swelling look red or infected? \"      Denies redness or infection    5. PAIN: \"Is the swelling painful to touch? \" If so, ask: \"How painful is it? \"   (Scale 1-10; mild, moderate or severe)      Denies pain    6. FEVER: \"Do you have a fever? \" If so, ask: \"What is it, how was it measured, and when did it start? \"       Denies fever    7. CAUSE: \"What do you think is causing the leg swelling? \"      \"I'm not drinking as much water as I used to, I am eating more salty stuff\"    8. MEDICAL HISTORY: \"Do you have a history of heart failure, kidney disease, liver failure, or cancer? \"      Denies history of heart failure, kidney disease, liver disease or cancer. 9. RECURRENT SYMPTOM: \"Have you had leg swelling before? \" If so, ask: \"When was the last time? \" \"What happened that time? \"      Denies    10. OTHER SYMPTOMS: \"Do you have any other symptoms? \" (e.g., chest pain, difficulty breathing)     Denies chest pain or shortness of breath    11. PREGNANCY: \"Is there any chance you are pregnant? \" \"When was your last menstrual period? \"        N/a    Protocols used: LEG SWELLING AND EDEMA-ADULT-OH

## 2021-10-12 NOTE — TELEPHONE ENCOUNTER
----- Message from Natalya Lloyd sent at 10/12/2021  3:45 PM EDT -----  Subject: Appointment Request    Reason for Call: Semi-Urgent Return from RN Triage    QUESTIONS  Type of Appointment? Established Patient  Reason for appointment request? Available appointments did not meet   patient need  Additional Information for Provider? Patient does not want to see a nurse   practioner for her appointment, and is a RNT to be seen within 3 days for   swollen ankles. She would like something Thursday after 12:30 pm, please   advise patient if she would be able to see Dr. Audrey Bain on that day/what her   other options are, she would not let me book her with Edwar Curtis CNP.   ---------------------------------------------------------------------------  --------------  [employer]  What is the best way for the office to contact you? Do not leave any   message, patient will call back for answer  Preferred Call Back Phone Number? [phone]  ---------------------------------------------------------------------------  --------------  SCRIPT ANSWERS  Patient needs to be seen within 3 days? Yes   Nurse Name? Rob  Have you been diagnosed with, awaiting test results for, or told that you   are suspected of having COVID-19 (Coronavirus)? (If patient has tested   negative or was tested as a requirement for work, school, or travel and   not based on symptoms, answer no)? No  Within the past two weeks have you developed any of the following symptoms   (answer no if symptoms have been present longer than 2 weeks or began   more than 2 weeks ago)? Fever or Chills, Cough, Shortness of breath or   difficulty breathing, Loss of taste or smell, Sore throat, Nasal   congestion, Sneezing or runny nose, Fatigue or generalized body aches   (answer no if pain is specific to a body part e.g. back pain), Diarrhea,   Headache? No  Have you had close contact with someone with COVID-19 in the last 14 days?    No  (Service Expert  click yes below to proceed with [employer] As Usual   Scheduling)?  Yes

## 2021-10-14 ENCOUNTER — OFFICE VISIT (OUTPATIENT)
Dept: FAMILY MEDICINE CLINIC | Age: 61
End: 2021-10-14
Payer: COMMERCIAL

## 2021-10-14 VITALS
BODY MASS INDEX: 28.89 KG/M2 | WEIGHT: 179 LBS | SYSTOLIC BLOOD PRESSURE: 142 MMHG | DIASTOLIC BLOOD PRESSURE: 100 MMHG | OXYGEN SATURATION: 100 % | HEART RATE: 120 BPM

## 2021-10-14 DIAGNOSIS — Z12.31 ENCOUNTER FOR SCREENING MAMMOGRAM FOR MALIGNANT NEOPLASM OF BREAST: ICD-10-CM

## 2021-10-14 DIAGNOSIS — I10 ESSENTIAL HYPERTENSION: ICD-10-CM

## 2021-10-14 DIAGNOSIS — E78.00 PURE HYPERCHOLESTEROLEMIA: ICD-10-CM

## 2021-10-14 DIAGNOSIS — R60.0 PEDAL EDEMA: Primary | ICD-10-CM

## 2021-10-14 PROCEDURE — 99213 OFFICE O/P EST LOW 20 MIN: CPT | Performed by: FAMILY MEDICINE

## 2021-10-14 RX ORDER — BETAMETHASONE DIPROPIONATE 0.5 MG/G
CREAM TOPICAL
COMMUNITY
Start: 2021-09-08

## 2021-10-14 RX ORDER — FUROSEMIDE 20 MG/1
20 TABLET ORAL DAILY PRN
Qty: 30 TABLET | Refills: 0 | Status: SHIPPED | OUTPATIENT
Start: 2021-10-14 | End: 2021-11-10

## 2021-10-14 ASSESSMENT — ENCOUNTER SYMPTOMS
GASTROINTESTINAL NEGATIVE: 1
RESPIRATORY NEGATIVE: 1

## 2021-10-14 NOTE — PROGRESS NOTES
Betsy Rhoades (:  1960) is a 61 y.o. female,Established patient, here for evaluation of the following chief complaint(s):  Swelling (Pt states her feet swelling x 2 weeks )         ASSESSMENT/PLAN:  1. Pedal edema  Trial of  -     furosemide (LASIX) 20 MG tablet; Take 1 tablet by mouth daily as needed (edema), Disp-30 tablet, R-0Normal  We also discussed that although unlikely due to the time she has been on amlodipine, the edema could possibly be due to the amlodipine. We also discussed salt intake and reduction of that as well as increasing her activity. 2. Essential hypertension  She was advised of her elevated blood pressure. She has not been checking her blood pressure but states that she will start to check this. She will return in 1 month for blood pressure check. -     CBC Auto Differential; Future  -     Comprehensive Metabolic Panel; Future  -     TSH with Reflex; Future  3. Pure hypercholesterolemia  -     Comprehensive Metabolic Panel; Future  -     Lipid Panel; Future  Await lab to determine stability  4. Encounter for screening mammogram for malignant neoplasm of breast  -     MARYJO DIGITAL SCREENING AUGMENTED BILATERAL; Future      Return in about 1 month (around 2021). Subjective   SUBJECTIVE/OBJECTIVE:  HPI  Patient states she has been having edema of her feet for the last 2 weeks. No pain. Worse as day goes on and better in am.  She is on amlodipine for blood pressure however has been on that for at least 2 years. She states she went back to work and is sitting in a desk most of the day rather than being active and she also states she is eating more fast food with the inherent salt intake associated with that. Review of Systems   Constitutional: Negative. Respiratory: Negative. Cardiovascular: Positive for leg swelling. Gastrointestinal: Negative. Genitourinary: Negative. Musculoskeletal: Negative.     Neurological: Positive for headaches ( her usual headaches). Hematological: Negative. Psychiatric/Behavioral: Negative. Objective   Physical Exam  Constitutional:       General: She is not in acute distress. Neck:      Thyroid: No thyromegaly. Vascular: No carotid bruit. Cardiovascular:      Rate and Rhythm: Normal rate and regular rhythm. Pulmonary:      Effort: Pulmonary effort is normal.      Breath sounds: Normal breath sounds. No wheezing or rales. Musculoskeletal:      Cervical back: Neck supple. Right lower leg: No edema. Left lower leg: No edema. Comments: Patient did not have any pitting edema of her feet at this time   Lymphadenopathy:      Cervical: No cervical adenopathy. Skin:     General: Skin is warm and dry. Neurological:      Mental Status: She is alert and oriented to person, place, and time. Psychiatric:         Behavior: Behavior normal.         Thought Content: Thought content normal.         Judgment: Judgment normal.                  An electronic signature was used to authenticate this note.     --Binu Sue MD

## 2021-12-14 ENCOUNTER — TELEPHONE (OUTPATIENT)
Dept: FAMILY MEDICINE CLINIC | Age: 61
End: 2021-12-14

## 2021-12-14 NOTE — TELEPHONE ENCOUNTER
Patient has been seeing Dr. Jarod Graham at [facility] for years, but she is unable to see him until the end of February. She states her eyes are burning and watery and she is having trouble seeing.       Is there someone in the [address] area you could refer her to?

## 2021-12-15 NOTE — TELEPHONE ENCOUNTER
No one in [place] however [facility] in [facility] has an emergency/urgent desk to see patients with acute problems  Someone in office should have the number to call.

## 2021-12-16 NOTE — TELEPHONE ENCOUNTER
Patient given [address] [facility] phone number. Patient notified and expressed understanding.

## 2021-12-29 ENCOUNTER — OFFICE VISIT (OUTPATIENT)
Dept: FAMILY MEDICINE CLINIC | Age: 61
End: 2021-12-29
Payer: COMMERCIAL

## 2021-12-29 ENCOUNTER — TELEPHONE (OUTPATIENT)
Dept: FAMILY MEDICINE CLINIC | Age: 61
End: 2021-12-29

## 2021-12-29 VITALS
WEIGHT: 175 LBS | OXYGEN SATURATION: 99 % | BODY MASS INDEX: 28.25 KG/M2 | SYSTOLIC BLOOD PRESSURE: 132 MMHG | HEART RATE: 78 BPM | DIASTOLIC BLOOD PRESSURE: 88 MMHG

## 2021-12-29 DIAGNOSIS — R05.9 COUGH: Primary | ICD-10-CM

## 2021-12-29 PROCEDURE — 99213 OFFICE O/P EST LOW 20 MIN: CPT | Performed by: FAMILY MEDICINE

## 2021-12-29 RX ORDER — AZITHROMYCIN 250 MG/1
TABLET, FILM COATED ORAL
Qty: 6 TABLET | Refills: 0 | Status: SHIPPED | OUTPATIENT
Start: 2021-12-29 | End: 2022-01-08

## 2021-12-29 ASSESSMENT — ENCOUNTER SYMPTOMS
GASTROINTESTINAL NEGATIVE: 1
SINUS PAIN: 0
WHEEZING: 0
SINUS PRESSURE: 0
SORE THROAT: 0
SHORTNESS OF BREATH: 0
COUGH: 1
RHINORRHEA: 0

## 2021-12-29 NOTE — PROGRESS NOTES
PT RESTING COMFORTABLY. PAIN IS NOW UNDER CONTROL AND PT IS PLEASENT. BRIAN
DRESSING IN PLACE ON LEFT KNEE, DRESSING C/D/I, NEUROVASCUAL CHECKS
APPROPRIATE. PT TO HAVE POSSIBLE EPIDURAL PLACED TOMORROW. PER [facility] RN THIS IS
OK TO DO PER DR BOWMAN. PT AFEBRILE, ADEQUATE UOP, NO BM, APPROPRIATE
APPETITE. PT AND FAMILY HAVE BEEN THOUROUGHLY UPDATED AND EDUCATED ON PT
CONDITION AND POC. PT SLOWLY PROGRESSING TOWARDS POC. oriented to person, place, and time. Psychiatric:         Behavior: Behavior normal.         Thought Content: Thought content normal.         Judgment: Judgment normal.                  An electronic signature was used to authenticate this note.     --Sae Leiva MD

## 2021-12-29 NOTE — TELEPHONE ENCOUNTER
Patient thinks she may have sinus infection  She has a cough,chest congestion,and runny nose,with fatigue headache,eyes burning. She would like to make a appointment to come in the office. She was offered a virtual visit. declined please advise

## 2021-12-30 LAB — SARS-COV-2: DETECTED

## 2022-01-03 ENCOUNTER — TELEPHONE (OUTPATIENT)
Dept: FAMILY MEDICINE CLINIC | Age: 62
End: 2022-01-03

## 2022-01-03 DIAGNOSIS — R05.9 COUGH: ICD-10-CM

## 2022-01-03 NOTE — LETTER
NOTIFICATION RETURN TO WORK / SCHOOL    1/4/2022    Ms. Juarez Bullock  [address] [place] [address]      To Whom It May Concern:    Juarez Bullock was tested for COVID-19 on 12/30, and the result was positive. She may return to work after 5 days with no fever but continue to wear a mask around others for 5 additional days    If there are questions or concerns, please have the patient contact our office.         Sincerely,      Javid Potter MD.

## 2022-01-03 NOTE — TELEPHONE ENCOUNTER
Okay for note for work. Other than symptomatic treatment, there is not much else to be done for Covid. A stronger cough medicine could be sent ie Phenergan with Codeine   Monoclonal antibodies are possibly available but would have to be scheduled at the hospital at the infusion center and have generally been in short supply. They also do not seem to be of much benefit in the omicron variant  I believe I sent her the CDC guidelines but you can reiterate them to her. Current CDC guidelines: As of 1/1/2022    If you test positive for Covid  Isolate  Stay home for 5 days. If you have no symptoms or your symptoms are resolving after 5 days you can leave the house. Continue to wear a mask around others for 5 additional days. If you have a fever continue to stay home until your fever resolves. Warning signs: If you are short of breath or if you measure your oxygen and it goes below 90% you should go to the emergency room.   Likewise if you are worried about anything else about the way you feel, you should go to the emergency room

## 2022-01-03 NOTE — TELEPHONE ENCOUNTER
Pt said she tested positive for covid 12/30. Said the zpak she was prescribed and the mucinex has not helped with the cough, tiredness and congestion, wants to know if something else is recommended. Pt said she went to work today, I informed her the quarantine was not up for her and that she should leave since she is still having symptoms. Pt said she needs a note to be excused from work.

## 2022-01-05 RX ORDER — PROMETHAZINE HYDROCHLORIDE AND CODEINE PHOSPHATE 6.25; 1 MG/5ML; MG/5ML
5 SYRUP ORAL 4 TIMES DAILY PRN
Qty: 120 ML | Refills: 0 | Status: SHIPPED | OUTPATIENT
Start: 2022-01-05 | End: 2022-01-12

## 2022-01-05 NOTE — TELEPHONE ENCOUNTER
Pt said she would like to try Phenergan with Codeine, please send to [facility] [address]. Pt said she is taking the mucinex again, wants to know if that can be taken together? Pt said she feels 100% better wants to know if she should be retested ?

## 2022-02-07 DIAGNOSIS — R51.9 HEADACHE, UNSPECIFIED HEADACHE TYPE: ICD-10-CM

## 2022-02-07 RX ORDER — BUTALBITAL, ACETAMINOPHEN AND CAFFEINE 50; 325; 40 MG/1; MG/1; MG/1
TABLET ORAL
Qty: 180 TABLET | Refills: 1 | Status: SHIPPED | OUTPATIENT
Start: 2022-02-07 | End: 2022-08-23

## 2022-02-07 NOTE — TELEPHONE ENCOUNTER
[facility]  [address]    Phone:  [phone]    Patient would like to be notified when sent. Patient would like to  this medication this afternoon.

## 2022-02-07 NOTE — TELEPHONE ENCOUNTER
Patient is in [place] for a week and forgot her medicationbutalbital-acetaminophen-caffeine (FIORICET, [address]) -40 MG per tablet     Could something be called in to the [facility] pharmacy in [place]? Patient will call back with the address and phone # of the pharmacy.

## 2022-08-08 RX ORDER — VALSARTAN AND HYDROCHLOROTHIAZIDE 160; 12.5 MG/1; MG/1
TABLET, FILM COATED ORAL
Qty: 30 TABLET | Refills: 3 | Status: SHIPPED | OUTPATIENT
Start: 2022-08-08

## 2022-08-08 NOTE — TELEPHONE ENCOUNTER
Medication:   Requested Prescriptions     Pending Prescriptions Disp Refills    valsartan-hydroCHLOROthiazide (DIOVAN-HCT) 160-12.5 MG per tablet [Pharmacy Med Name: Rainer Gardner 160-12.5 MG TAB] 30 tablet 9     Sig: TAKE 1 TABLET BY MOUTH EVERY DAY        Last Filled:  30 x 9 RF 9/13/21    Patient Phone Number: [phone] (home) [phone] (work)    Last appt: 12/29/2021   Next appt: Visit date not found    Last OARRS: No flowsheet data found.

## 2022-08-23 DIAGNOSIS — R51.9 HEADACHE, UNSPECIFIED HEADACHE TYPE: ICD-10-CM

## 2022-08-23 RX ORDER — BUTALBITAL, ACETAMINOPHEN AND CAFFEINE 50; 325; 40 MG/1; MG/1; MG/1
TABLET ORAL
Qty: 180 TABLET | Refills: 0 | Status: SHIPPED | OUTPATIENT
Start: 2022-08-23

## 2022-08-23 NOTE — TELEPHONE ENCOUNTER
Medication:   Requested Prescriptions     Pending Prescriptions Disp Refills    butalbital-acetaminophen-caffeine (FIORICET, ESGIC) -40 MG per tablet [Pharmacy Med Name: CQUQKN-SHWQTQZU-BEAH -40] 180 tablet 1     Sig: TAKE 1 TAB BY MOUTH EVERY 4 HRS AS NEEDED FOR HEADACHE        Last Filled:  2/7/2022, 180, 1    Patient Phone Number: [phone] (home) [phone] (work)    Last appt: 12/29/2021   Next appt: Visit date not found    Last OARRS: No flowsheet data found.

## 2022-09-07 DIAGNOSIS — I10 ESSENTIAL HYPERTENSION: ICD-10-CM

## 2022-09-07 RX ORDER — AMLODIPINE BESYLATE 5 MG/1
TABLET ORAL
Qty: 30 TABLET | Refills: 11 | Status: SHIPPED | OUTPATIENT
Start: 2022-09-07

## 2022-09-07 NOTE — TELEPHONE ENCOUNTER
Medication:   Requested Prescriptions     Pending Prescriptions Disp Refills    amLODIPine (NORVASC) 5 MG tablet [Pharmacy Med Name: AMLODIPINE BESYLATE 5 MG TAB] 30 tablet 11     Sig: TAKE 1 TABLET BY MOUTH EVERY DAY       Last Filled:  8/16/2021    Patient Phone Number: [phone] (home) [phone] (work)    Last appt: 12/29/2021   Next appt: Visit date not found    Lab Results   Component Value Date     08/10/2020    K 3.6 08/10/2020     08/10/2020    CO2 27 08/10/2020    BUN 13 08/10/2020    CREATININE 0.9 08/10/2020    GLUCOSE 84 08/10/2020    CALCIUM 9.5 08/10/2020    PROT 7.1 08/10/2020    LABALBU 4.2 08/10/2020    BILITOT 0.4 08/10/2020    ALKPHOS 75 08/10/2020    AST 22 08/10/2020    ALT 13 08/10/2020    LABGLOM >60 08/10/2020    GFRAA >60 08/10/2020    AGRATIO 1.4 08/10/2020    GLOB 2.9 08/10/2020

## 2022-11-21 ENCOUNTER — TELEPHONE (OUTPATIENT)
Dept: FAMILY MEDICINE CLINIC | Age: 62
End: 2022-11-21

## 2022-11-21 NOTE — TELEPHONE ENCOUNTER
----- Message from Julianna Stringer sent at 11/21/2022  8:53 AM EST -----  Subject: Appointment Request    Reason for Call: Established Patient Appointment needed: Semi-Routine Skin   Problem    QUESTIONS    Reason for appointment request? Available appointments did not meet   patient need     Additional Information for Provider? Patient having intense itching in the   middle of her back and cortisone not working, she's scratching skin off. Wants to know if there is anything else she can use.   ---------------------------------------------------------------------------  --------------  Kim Santa INFO  [phone];  Do not leave any message, patient will call back for answer  ---------------------------------------------------------------------------  --------------  SCRIPT ANSWERS  COVID Screen: Tyra Castelan

## 2022-11-22 ENCOUNTER — OFFICE VISIT (OUTPATIENT)
Dept: FAMILY MEDICINE CLINIC | Age: 62
End: 2022-11-22
Payer: COMMERCIAL

## 2022-11-22 VITALS
HEART RATE: 90 BPM | DIASTOLIC BLOOD PRESSURE: 72 MMHG | SYSTOLIC BLOOD PRESSURE: 118 MMHG | WEIGHT: 180 LBS | OXYGEN SATURATION: 98 % | BODY MASS INDEX: 28.93 KG/M2 | HEIGHT: 66 IN

## 2022-11-22 DIAGNOSIS — E78.00 PURE HYPERCHOLESTEROLEMIA: ICD-10-CM

## 2022-11-22 DIAGNOSIS — I10 ESSENTIAL HYPERTENSION: ICD-10-CM

## 2022-11-22 DIAGNOSIS — L29.9 PRURITUS: Primary | ICD-10-CM

## 2022-11-22 PROCEDURE — 3074F SYST BP LT 130 MM HG: CPT | Performed by: FAMILY MEDICINE

## 2022-11-22 PROCEDURE — 99213 OFFICE O/P EST LOW 20 MIN: CPT | Performed by: FAMILY MEDICINE

## 2022-11-22 PROCEDURE — 3078F DIAST BP <80 MM HG: CPT | Performed by: FAMILY MEDICINE

## 2022-11-22 RX ORDER — METHYLPREDNISOLONE 4 MG/1
TABLET ORAL
Qty: 1 KIT | Refills: 0 | Status: SHIPPED | OUTPATIENT
Start: 2022-11-22 | End: 2022-11-28

## 2022-11-22 SDOH — ECONOMIC STABILITY: FOOD INSECURITY: WITHIN THE PAST 12 MONTHS, YOU WORRIED THAT YOUR FOOD WOULD RUN OUT BEFORE YOU GOT MONEY TO BUY MORE.: NEVER TRUE

## 2022-11-22 SDOH — ECONOMIC STABILITY: FOOD INSECURITY: WITHIN THE PAST 12 MONTHS, THE FOOD YOU BOUGHT JUST DIDN'T LAST AND YOU DIDN'T HAVE MONEY TO GET MORE.: NEVER TRUE

## 2022-11-22 ASSESSMENT — PATIENT HEALTH QUESTIONNAIRE - PHQ9
1. LITTLE INTEREST OR PLEASURE IN DOING THINGS: 0
SUM OF ALL RESPONSES TO PHQ QUESTIONS 1-9: 0
SUM OF ALL RESPONSES TO PHQ QUESTIONS 1-9: 0
2. FEELING DOWN, DEPRESSED OR HOPELESS: 0
SUM OF ALL RESPONSES TO PHQ9 QUESTIONS 1 & 2: 0
SUM OF ALL RESPONSES TO PHQ QUESTIONS 1-9: 0
SUM OF ALL RESPONSES TO PHQ QUESTIONS 1-9: 0

## 2022-11-22 ASSESSMENT — SOCIAL DETERMINANTS OF HEALTH (SDOH): HOW HARD IS IT FOR YOU TO PAY FOR THE VERY BASICS LIKE FOOD, HOUSING, MEDICAL CARE, AND HEATING?: NOT HARD AT ALL

## 2022-11-22 ASSESSMENT — ENCOUNTER SYMPTOMS
GASTROINTESTINAL NEGATIVE: 1
RESPIRATORY NEGATIVE: 1
ROS SKIN COMMENTS: PER HPI

## 2022-11-22 NOTE — PROGRESS NOTES
Tommy Morrison (:  1960) is a 64 y.o. female,Established patient, here for evaluation of the following chief complaint(s):  Follow-up         ASSESSMENT/PLAN:  1. Pruritus  Generally negative exam.  Trial of  -     methylPREDNISolone (MEDROL, RODERICK,) 4 MG tablet; Take with food as directed on packaging., Disp-1 kit, R-0Normal  Consider lidocaine patch  Pure hypercholesterolemia  -     Comprehensive Metabolic Panel; Future  -     Lipid Panel; Future  Essential hypertension  -     CBC with Auto Differential; Future  -     TSH with Reflex; Future         Return if symptoms worsen or fail to improve. Subjective   SUBJECTIVE/OBJECTIVE:  HPI  Started with itching by her bra strap on her back xs a few weeks. No specific etiology. No rash. No systemic symptoms  Review of Systems   Constitutional: Negative. Respiratory: Negative. Cardiovascular: Negative. Gastrointestinal: Negative. Endocrine: Negative. Genitourinary: Negative. Musculoskeletal: Negative. Skin:         Per HPI   Neurological: Negative. Psychiatric/Behavioral: Negative. Objective   Physical Exam  Constitutional:       General: She is not in acute distress. Appearance: She is not ill-appearing, toxic-appearing or diaphoretic. HENT:      Head: Normocephalic and atraumatic. Eyes:      Conjunctiva/sclera: Conjunctivae normal.   Skin:     General: Skin is warm and dry. Neurological:      Mental Status: She is alert and oriented to person, place, and time. Psychiatric:         Mood and Affect: Mood normal.         Behavior: Behavior normal.         Thought Content: Thought content normal.         Judgment: Judgment normal.                An electronic signature was used to authenticate this note.     --Lilibeth Sanchez MD

## 2022-12-08 ENCOUNTER — NURSE ONLY (OUTPATIENT)
Dept: FAMILY MEDICINE CLINIC | Age: 62
End: 2022-12-08
Payer: COMMERCIAL

## 2022-12-08 DIAGNOSIS — R05.9 COUGH IN ADULT PATIENT: Primary | ICD-10-CM

## 2022-12-08 LAB
INFLUENZA A ANTIBODY: NEGATIVE
INFLUENZA B ANTIBODY: NEGATIVE

## 2022-12-08 PROCEDURE — 87804 INFLUENZA ASSAY W/OPTIC: CPT | Performed by: FAMILY MEDICINE

## 2022-12-09 ENCOUNTER — TELEPHONE (OUTPATIENT)
Dept: FAMILY MEDICINE CLINIC | Age: 62
End: 2022-12-09

## 2022-12-09 DIAGNOSIS — R05.1 ACUTE COUGH: Primary | ICD-10-CM

## 2022-12-09 LAB — SARS-COV-2: NOT DETECTED

## 2022-12-09 RX ORDER — AZITHROMYCIN 250 MG/1
TABLET, FILM COATED ORAL
Qty: 6 TABLET | Refills: 0 | Status: SHIPPED | OUTPATIENT
Start: 2022-12-09 | End: 2022-12-19

## 2022-12-09 NOTE — TELEPHONE ENCOUNTER
Patient tested negative for both flu and covid. still has symptoms of a cold. Didn't know if MD could send over something or if patient should be seen in office. Please advise. Pharmacy is updated in chart. Call patient back if we are sending something to pharmacy.

## 2022-12-14 RX ORDER — VALSARTAN AND HYDROCHLOROTHIAZIDE 160; 12.5 MG/1; MG/1
TABLET, FILM COATED ORAL
Qty: 30 TABLET | Refills: 5 | Status: SHIPPED | OUTPATIENT
Start: 2022-12-14

## 2022-12-14 NOTE — TELEPHONE ENCOUNTER
Medication:   Requested Prescriptions     Pending Prescriptions Disp Refills    valsartan-hydroCHLOROthiazide (DIOVAN-HCT) 160-12.5 MG per tablet [Pharmacy Med Name: VALSARTAN-HCTZ 160-12.5 MG TAB] 30 tablet 3     Sig: TAKE 1 TABLET BY MOUTH EVERY DAY       Last Filled:  8/8/2022, 30, 3    Patient Phone Number: [phone] (home) [phone] (work)    Last appt: 11/22/2022   Next appt: Visit date not found    Lab Results   Component Value Date     08/10/2020    K 3.6 08/10/2020     08/10/2020    CO2 27 08/10/2020    BUN 13 08/10/2020    CREATININE 0.9 08/10/2020    GLUCOSE 84 08/10/2020    CALCIUM 9.5 08/10/2020    PROT 7.1 08/10/2020    LABALBU 4.2 08/10/2020    BILITOT 0.4 08/10/2020    ALKPHOS 75 08/10/2020    AST 22 08/10/2020    ALT 13 08/10/2020    LABGLOM >60 08/10/2020    GFRAA >60 08/10/2020    AGRATIO 1.4 08/10/2020    GLOB 2.9 08/10/2020

## 2022-12-28 DIAGNOSIS — R51.9 HEADACHE, UNSPECIFIED HEADACHE TYPE: ICD-10-CM

## 2022-12-29 RX ORDER — BUTALBITAL, ACETAMINOPHEN AND CAFFEINE 50; 325; 40 MG/1; MG/1; MG/1
TABLET ORAL
Qty: 180 TABLET | Refills: 0 | Status: SHIPPED | OUTPATIENT
Start: 2022-12-29

## 2022-12-29 NOTE — TELEPHONE ENCOUNTER
Medication:   Requested Prescriptions     Pending Prescriptions Disp Refills    butalbital-acetaminophen-caffeine (FIORICET, ESGIC) -40 MG per tablet [Pharmacy Med Name: [id number]-IJANXOTN-ANOJ -40] 180 tablet 0     Sig: TAKE 1 TAB BY MOUTH EVERY 4 HRS AS NEEDED FOR HEADACHE        Last Filled:  8/23/2022, 180, 0    Patient Phone Number: [phone] (home) [phone] (work)    Last appt: 11/22/2022   Next appt: Visit date not found    Last OARRS: No flowsheet data found.

## 2023-01-04 ENCOUNTER — TELEPHONE (OUTPATIENT)
Dept: FAMILY MEDICINE CLINIC | Age: 63
End: 2023-01-04

## 2023-01-04 NOTE — TELEPHONE ENCOUNTER
Pt has been having chills and been resting in bed since Monday.  She does not have a fever but she is lethargic    She is planning on going back into work tomorrow    She would like a work note excusing her from work [address] [facility] 01/02/23-01/04/23      Please advise and call pt to  the note

## 2023-01-04 NOTE — LETTER
[place] [address]  Phone: [phone]  Fax: [phone]    Shivam Ritchie MD        January 9, 2023     Patient: Everardo Jacobs   YOB: 1960   Date of Visit: 1/4/2023       To Whom it May Concern:    Please excuse my patient Iveth Holt,[birth year], from work on 1/3/23 through 1/4/23 as she was sick . She may return to work on 1/5/23. If you have any questions or concerns, please don't hesitate to call.     Sincerely,         Shivam Ritchie MD

## 2023-03-29 DIAGNOSIS — R51.9 HEADACHE, UNSPECIFIED HEADACHE TYPE: ICD-10-CM

## 2023-03-30 RX ORDER — BUTALBITAL, ACETAMINOPHEN AND CAFFEINE 50; 325; 40 MG/1; MG/1; MG/1
TABLET ORAL
Qty: 180 TABLET | Refills: 0 | Status: SHIPPED | OUTPATIENT
Start: 2023-03-30

## 2023-03-30 NOTE — TELEPHONE ENCOUNTER
Medication:   Requested Prescriptions     Pending Prescriptions Disp Refills    butalbital-acetaminophen-caffeine (FIORICET, ESGIC) -40 MG per tablet [Pharmacy Med Name: [id number]-[id number] -40] 180 tablet 0     Sig: TAKE 1 TAB BY MOUTH EVERY 4 HOURS AS NEEDED FOR HEADACHE        Last Filled:  12/29/2022, 180, 0    Patient Phone Number: [phone] (home) [phone] (work)    Last appt: 11/22/2022   Next appt: Visit date not found    Last OARRS: No flowsheet data found.

## 2023-05-09 ENCOUNTER — NURSE TRIAGE (OUTPATIENT)
Dept: OTHER | Facility: CLINIC | Age: 63
End: 2023-05-09

## 2023-05-09 NOTE — TELEPHONE ENCOUNTER
Location of patient: Sarah Encinas call from [facility] with ams AG. Subjective: Caller states \"I have to go to bed when I get home from work and that is just not like me\"     Current Symptoms: fatigue (lack of energy), headaches (takes migraine medication), took [address] for chest pain after eating out Sunday - the pain then went away    Onset: 1 month ago; worsening    Associated Symptoms: NA    Pain Severity: 0/10; N/A; none    Temperature: denies       What has been tried: Takes supplements, Intake extra fluids    LMP: NA Pregnant: No    Recommended disposition: See PCP within 24 Hours    Care advice provided (Intake extra fluids), patient verbalizes understanding; denies any other questions or concerns; instructed to call back for any new or worsening symptoms. Patient/Caller agrees with recommended disposition; writer provided warm transfer to [facility] for appointment scheduling    Attention Provider: Thank you for allowing me to participate in the care of your patient. The patient was connected to triage in response to information provided to the ECC/PSC. Please do not respond through this encounter as the response is not directed to a shared pool.       Reason for Disposition   [1] MODERATE weakness (i.e., interferes with work, school, normal activities) AND [2] persists > 3 days    Protocols used: Weakness (Generalized) and Fatigue-ADULT-

## 2023-05-10 ENCOUNTER — OFFICE VISIT (OUTPATIENT)
Dept: FAMILY MEDICINE CLINIC | Age: 63
End: 2023-05-10
Payer: COMMERCIAL

## 2023-05-10 VITALS
HEART RATE: 84 BPM | TEMPERATURE: 97.3 F | DIASTOLIC BLOOD PRESSURE: 89 MMHG | BODY MASS INDEX: 28.45 KG/M2 | SYSTOLIC BLOOD PRESSURE: 137 MMHG | OXYGEN SATURATION: 99 % | WEIGHT: 177 LBS | HEIGHT: 66 IN | RESPIRATION RATE: 16 BRPM

## 2023-05-10 DIAGNOSIS — R53.83 FATIGUE, UNSPECIFIED TYPE: ICD-10-CM

## 2023-05-10 DIAGNOSIS — R40.0 DAYTIME SLEEPINESS: ICD-10-CM

## 2023-05-10 DIAGNOSIS — R53.83 FATIGUE, UNSPECIFIED TYPE: Primary | ICD-10-CM

## 2023-05-10 DIAGNOSIS — I10 ESSENTIAL HYPERTENSION: ICD-10-CM

## 2023-05-10 DIAGNOSIS — R12 HEARTBURN: ICD-10-CM

## 2023-05-10 DIAGNOSIS — R06.83 SNORING: ICD-10-CM

## 2023-05-10 DIAGNOSIS — R30.0 DYSURIA: ICD-10-CM

## 2023-05-10 LAB
BASOPHILS # BLD: 0 K/UL (ref 0–0.2)
BASOPHILS NFR BLD: 0.8 %
BILIRUBIN, POC: NEGATIVE
BLOOD URINE, POC: NEGATIVE
CLARITY, POC: CLEAR
COLOR, POC: YELLOW
DEPRECATED RDW RBC AUTO: 14.5 % (ref 12.4–15.4)
EOSINOPHIL # BLD: 0 K/UL (ref 0–0.6)
EOSINOPHIL NFR BLD: 0.7 %
GLUCOSE URINE, POC: NEGATIVE
HCT VFR BLD AUTO: 37.7 % (ref 36–48)
HGB BLD-MCNC: 12 G/DL (ref 12–16)
KETONES, POC: NEGATIVE
LEUKOCYTE EST, POC: NEGATIVE
LYMPHOCYTES # BLD: 1.3 K/UL (ref 1–5.1)
LYMPHOCYTES NFR BLD: 31.2 %
MCH RBC QN AUTO: 27.9 PG (ref 26–34)
MCHC RBC AUTO-ENTMCNC: 31.9 G/DL (ref 31–36)
MCV RBC AUTO: 87.3 FL (ref 80–100)
MONOCYTES # BLD: 0.4 K/UL (ref 0–1.3)
MONOCYTES NFR BLD: 9.5 %
NEUTROPHILS # BLD: 2.5 K/UL (ref 1.7–7.7)
NEUTROPHILS NFR BLD: 57.8 %
NITRITE, POC: NEGATIVE
PH, POC: 6.5
PLATELET # BLD AUTO: 201 K/UL (ref 135–450)
PMV BLD AUTO: 10.6 FL (ref 5–10.5)
PROTEIN, POC: NEGATIVE
RBC # BLD AUTO: 4.32 M/UL (ref 4–5.2)
SPECIFIC GRAVITY, POC: 1.02
TSH SERPL DL<=0.005 MIU/L-ACNC: 1.11 UIU/ML (ref 0.27–4.2)
UROBILINOGEN, POC: 0.2
WBC # BLD AUTO: 4.3 K/UL (ref 4–11)

## 2023-05-10 PROCEDURE — 3079F DIAST BP 80-89 MM HG: CPT | Performed by: FAMILY MEDICINE

## 2023-05-10 PROCEDURE — 3075F SYST BP GE 130 - 139MM HG: CPT | Performed by: FAMILY MEDICINE

## 2023-05-10 PROCEDURE — 99214 OFFICE O/P EST MOD 30 MIN: CPT | Performed by: FAMILY MEDICINE

## 2023-05-10 PROCEDURE — 81002 URINALYSIS NONAUTO W/O SCOPE: CPT | Performed by: FAMILY MEDICINE

## 2023-05-10 RX ORDER — AMLODIPINE BESYLATE 5 MG/1
5 TABLET ORAL DAILY
Qty: 90 TABLET | Refills: 1 | Status: SHIPPED | OUTPATIENT
Start: 2023-05-10

## 2023-05-10 RX ORDER — VALSARTAN AND HYDROCHLOROTHIAZIDE 160; 12.5 MG/1; MG/1
1 TABLET, FILM COATED ORAL DAILY
Qty: 90 TABLET | Refills: 1 | Status: SHIPPED | OUTPATIENT
Start: 2023-05-10

## 2023-05-10 RX ORDER — CHOLECALCIFEROL (VITAMIN D3) 125 MCG
500 CAPSULE ORAL DAILY
COMMUNITY

## 2023-05-10 RX ORDER — BUTALBITAL, ACETAMINOPHEN AND CAFFEINE 50; 325; 40 MG/1; MG/1; MG/1
TABLET ORAL
Qty: 180 TABLET | Refills: 0 | Status: CANCELLED | OUTPATIENT
Start: 2023-05-10

## 2023-05-10 SDOH — ECONOMIC STABILITY: FOOD INSECURITY: WITHIN THE PAST 12 MONTHS, THE FOOD YOU BOUGHT JUST DIDN'T LAST AND YOU DIDN'T HAVE MONEY TO GET MORE.: NEVER TRUE

## 2023-05-10 SDOH — ECONOMIC STABILITY: INCOME INSECURITY: HOW HARD IS IT FOR YOU TO PAY FOR THE VERY BASICS LIKE FOOD, HOUSING, MEDICAL CARE, AND HEATING?: NOT HARD AT ALL

## 2023-05-10 SDOH — ECONOMIC STABILITY: FOOD INSECURITY: WITHIN THE PAST 12 MONTHS, YOU WORRIED THAT YOUR FOOD WOULD RUN OUT BEFORE YOU GOT MONEY TO BUY MORE.: NEVER TRUE

## 2023-05-10 SDOH — ECONOMIC STABILITY: HOUSING INSECURITY
IN THE LAST 12 MONTHS, WAS THERE A TIME WHEN YOU DID NOT HAVE A STEADY PLACE TO SLEEP OR SLEPT IN A SHELTER (INCLUDING NOW)?: NO

## 2023-05-10 ASSESSMENT — PATIENT HEALTH QUESTIONNAIRE - PHQ9
2. FEELING DOWN, DEPRESSED OR HOPELESS: 0
SUM OF ALL RESPONSES TO PHQ9 QUESTIONS 1 & 2: 0
SUM OF ALL RESPONSES TO PHQ QUESTIONS 1-9: 0
1. LITTLE INTEREST OR PLEASURE IN DOING THINGS: 0

## 2023-05-10 NOTE — PROGRESS NOTES
[address] Note    Date: 5/10/2023                                               Baljinder Smith:     Chief Complaint   Patient presents with    Other     Over the last couple months feeling tired and fatigue, couple hours after getting grandchild aff the bus feels tired. Chest pains a couple days ago and took gasX pilland felt better        HPI  Patient reports worsening fatigue over the last few months. Works in the morning and now has to take a nap until 4pm when she gets home at noon. This is unusual for her. Sleeps well during the night. Pt's  says pt snores. Has not been told that she stops sleeping at night. Denies fever or night sweats or skin changes. No joint pain. Pt reports that last week she had some tightness in her chest after eating dinner, resolved after an hour with a Gas-X. No SOB. No dizziness.          Patient Active Problem List    Diagnosis Date Noted    Pure hypercholesterolemia 02/25/2019    Sarcoid     HA (headache) 06/21/2013    Essential hypertension        Past Medical History:   Diagnosis Date    Brain aneurysm 08/2011    Dr Concepcion Hutchison: [facility]    Headache(784.0)     Hypertension     Kidney stone on right side 11/21/2017    on CT, nonobstructing    Sarcoid 5/2015    bilateral eyes: Mario Bahena MD       Current Outpatient Medications   Medication Sig Dispense Refill    vitamin B-12 (CYANOCOBALAMIN) 500 MCG tablet Take 1 tablet by mouth daily      valsartan-hydroCHLOROthiazide (DIOVAN-HCT) 160-12.5 MG per tablet Take 1 tablet by mouth daily 90 tablet 1    amLODIPine (NORVASC) 5 MG tablet Take 1 tablet by mouth daily 90 tablet 1    butalbital-acetaminophen-caffeine (FIORICET, ESGIC) -40 MG per tablet TAKE 1 TAB BY MOUTH EVERY 4 HOURS AS NEEDED FOR HEADACHE 180 tablet 0    Biotin 5000 MCG TABS Take 10,000 mcg by mouth once       Ascorbic Acid (VITAMIN C) 500 MG tablet Take by mouth daily      Multiple Vitamins-Minerals (THERAPEUTIC MULTIVITAMIN-MINERALS) tablet

## 2023-05-11 DIAGNOSIS — E78.00 PURE HYPERCHOLESTEROLEMIA: ICD-10-CM

## 2023-05-11 LAB
ALBUMIN SERPL-MCNC: 4.5 G/DL (ref 3.4–5)
ALBUMIN/GLOB SERPL: 1.5 {RATIO} (ref 1.1–2.2)
ALP SERPL-CCNC: 100 U/L (ref 40–129)
ALT SERPL-CCNC: 13 U/L (ref 10–40)
ANION GAP SERPL CALCULATED.3IONS-SCNC: 10 MMOL/L (ref 3–16)
AST SERPL-CCNC: 21 U/L (ref 15–37)
BILIRUB SERPL-MCNC: <0.2 MG/DL (ref 0–1)
BUN SERPL-MCNC: 17 MG/DL (ref 7–20)
CALCIUM SERPL-MCNC: 9.4 MG/DL (ref 8.3–10.6)
CHLORIDE SERPL-SCNC: 103 MMOL/L (ref 99–110)
CHOLEST SERPL-MCNC: 211 MG/DL (ref 0–199)
CO2 SERPL-SCNC: 28 MMOL/L (ref 21–32)
CREAT SERPL-MCNC: 0.9 MG/DL (ref 0.6–1.2)
GFR SERPLBLD CREATININE-BSD FMLA CKD-EPI: >60 ML/MIN/{1.73_M2}
GLUCOSE SERPL-MCNC: 80 MG/DL (ref 70–99)
HDLC SERPL-MCNC: 78 MG/DL (ref 40–60)
LDLC SERPL CALC-MCNC: 109 MG/DL
POTASSIUM SERPL-SCNC: 4.2 MMOL/L (ref 3.5–5.1)
PROT SERPL-MCNC: 7.6 G/DL (ref 6.4–8.2)
SODIUM SERPL-SCNC: 141 MMOL/L (ref 136–145)
TRIGL SERPL-MCNC: 118 MG/DL (ref 0–150)
VLDLC SERPL CALC-MCNC: 24 MG/DL

## 2023-06-15 ENCOUNTER — HOSPITAL ENCOUNTER (EMERGENCY)
Age: 63
Discharge: HOME | End: 2023-06-15
Attending: INTERNAL MEDICINE
Payer: COMMERCIAL

## 2023-06-15 VITALS
DIASTOLIC BLOOD PRESSURE: 98 MMHG | HEIGHT: 65 IN | RESPIRATION RATE: 18 BRPM | SYSTOLIC BLOOD PRESSURE: 151 MMHG | OXYGEN SATURATION: 98 % | HEART RATE: 96 BPM | BODY MASS INDEX: 29.45 KG/M2 | TEMPERATURE: 97.9 F

## 2023-06-15 DIAGNOSIS — R04.0 EPISTAXIS: Primary | ICD-10-CM

## 2023-06-15 PROCEDURE — 99282 EMERGENCY DEPT VISIT SF MDM: CPT

## 2023-06-15 PROCEDURE — 30901 CONTROL OF NOSEBLEED: CPT

## 2023-06-15 RX ORDER — TRANEXAMIC ACID 100 MG/ML
500 INJECTION, SOLUTION INTRAVENOUS ONCE
Status: DISCONTINUED | OUTPATIENT
Start: 2023-06-15 | End: 2023-06-15 | Stop reason: HOSPADM

## 2023-06-15 RX ORDER — OXYMETAZOLINE HYDROCHLORIDE 0.05 G/100ML
2 SPRAY NASAL ONCE
Status: DISCONTINUED | OUTPATIENT
Start: 2023-06-15 | End: 2023-06-15 | Stop reason: HOSPADM

## 2023-06-15 ASSESSMENT — PAIN - FUNCTIONAL ASSESSMENT: PAIN_FUNCTIONAL_ASSESSMENT: NONE - DENIES PAIN

## 2023-06-15 NOTE — ED PROVIDER NOTES
EMERGENCY MEDICINE PROVIDER NOTE    Patient Identification  Pt Name: Tenzin Gray  MRN: [id number]  [place] 1960  Date of evaluation: 6/15/2023  Provider: Nirmala Carrion DO  PCP: Randa Toribio MD    Chief Complaint  Epistaxis (Nose bleed that started out of nowhere today. )      HPI  (History provided by patient)  This is a 58 y.o. female who was brought in by self for left-sided nosebleed started 20 minutes prior to arrival.  Patient is not on blood thinners. Patient denies trauma. Patient states that her nose started to bleed without warning. Patient denies any facial trauma. I have reviewed the following nursing documentation:  Allergies: Patient has no known allergies. Past medical history:   Past Medical History:   Diagnosis Date    Brain aneurysm 08/2011    Dr Concepcion Hutchison:     [id number](028.4)     Hypertension     Kidney stone on right side 11/21/2017    on CT, nonobstructing    Sarcoid 5/2015    bilateral eyes: Mario Bahena MD     Past surgical history:   Past Surgical History:   Procedure Laterality Date    BRAIN ANEURYSM SURGERY  8/2011    [facility]: Dr Lesli Pavon  08/2011    [facility] [address] medications:   Discharge Medication List as of 6/15/2023  7:34 PM        CONTINUE these medications which have NOT CHANGED    Details   vitamin B-12 (CYANOCOBALAMIN) 500 MCG tablet Take 1 tablet by mouth dailyHistorical Med      valsartan-hydroCHLOROthiazide (DIOVAN-HCT) 160-12.5 MG per tablet Take 1 tablet by mouth daily, Disp-90 tablet, R-1Normal      amLODIPine (NORVASC) 5 MG tablet Take 1 tablet by mouth daily, Disp-90 tablet, R-1Normal      butalbital-acetaminophen-caffeine (FIORICET, ESGIC) -40 MG per tablet TAKE 1 TAB BY MOUTH EVERY 4 HOURS AS NEEDED FOR HEADACHE, Disp-180 tablet, R-0DX Code Needed  . Normal      Aspirin-Acetaminophen-Caffeine (EQ HEADACHE RELIEF PO) Take by mouth [facility] brandHistorical Med      naproxen (NAPROSYN) 250 MG tablet Take 2

## 2023-08-17 DIAGNOSIS — B36.9 FUNGAL DERMATITIS: ICD-10-CM

## 2023-08-17 NOTE — TELEPHONE ENCOUNTER
Medication and Quantity requested: nystatin (MYCOSTATIN) 943602 UNIT/GM cream   Pt has a rash underneath her breast because she has been having hot flashes and it itches.  Pt would like the above cream called in to help,    Last Visit  5/10/2023    Pharmacy and phone number updated in [facility]:  yes

## 2023-08-18 RX ORDER — NYSTATIN 100000 U/G
CREAM TOPICAL
Qty: 30 G | Refills: 1 | Status: SHIPPED | OUTPATIENT
Start: 2023-08-18

## 2023-08-18 NOTE — TELEPHONE ENCOUNTER
Medication:   Requested Prescriptions     Pending Prescriptions Disp Refills    nystatin (MYCOSTATIN) [id number] UNIT/GM cream 30 g 1     Sig: Apply topically 2 times daily. Last Filled:  1/22/2020, 30g, 1    Patient Phone Number: [phone] (home) [phone] (work)    Last appt: 5/10/2023   Next appt: Visit date not found    Last OARRS: No flowsheet data found.

## 2024-02-01 ENCOUNTER — OFFICE VISIT (OUTPATIENT)
Dept: FAMILY MEDICINE CLINIC | Age: 64
End: 2024-02-01
Payer: COMMERCIAL

## 2024-02-01 VITALS
OXYGEN SATURATION: 98 % | WEIGHT: 180.8 LBS | HEART RATE: 80 BPM | SYSTOLIC BLOOD PRESSURE: 142 MMHG | DIASTOLIC BLOOD PRESSURE: 94 MMHG | BODY MASS INDEX: 29.06 KG/M2 | HEIGHT: 66 IN

## 2024-02-01 DIAGNOSIS — I10 ESSENTIAL HYPERTENSION: Primary | ICD-10-CM

## 2024-02-01 DIAGNOSIS — R51.9 HEADACHE, UNSPECIFIED HEADACHE TYPE: ICD-10-CM

## 2024-02-01 DIAGNOSIS — L29.9 PRURITUS: ICD-10-CM

## 2024-02-01 PROCEDURE — 3077F SYST BP >= 140 MM HG: CPT | Performed by: STUDENT IN AN ORGANIZED HEALTH CARE EDUCATION/TRAINING PROGRAM

## 2024-02-01 PROCEDURE — 3080F DIAST BP >= 90 MM HG: CPT | Performed by: STUDENT IN AN ORGANIZED HEALTH CARE EDUCATION/TRAINING PROGRAM

## 2024-02-01 PROCEDURE — 99214 OFFICE O/P EST MOD 30 MIN: CPT | Performed by: STUDENT IN AN ORGANIZED HEALTH CARE EDUCATION/TRAINING PROGRAM

## 2024-02-01 RX ORDER — CERAMIDES 1,3,6-II
1 CREAM (GRAM) TOPICAL 4 TIMES DAILY PRN
Qty: 453 G | Refills: 1 | Status: SHIPPED | OUTPATIENT
Start: 2024-02-01

## 2024-02-01 RX ORDER — AMLODIPINE BESYLATE 5 MG/1
5 TABLET ORAL DAILY
Qty: 90 TABLET | Refills: 1 | Status: SHIPPED | OUTPATIENT
Start: 2024-02-01

## 2024-02-01 RX ORDER — KETOCONAZOLE 20 MG/ML
SHAMPOO TOPICAL
Qty: 100 ML | Refills: 0 | Status: SHIPPED | OUTPATIENT
Start: 2024-02-01

## 2024-02-01 RX ORDER — VALSARTAN AND HYDROCHLOROTHIAZIDE 160; 12.5 MG/1; MG/1
1 TABLET, FILM COATED ORAL DAILY
Qty: 90 TABLET | Refills: 1 | Status: SHIPPED | OUTPATIENT
Start: 2024-02-01

## 2024-02-01 RX ORDER — BUTALBITAL, ACETAMINOPHEN AND CAFFEINE 50; 325; 40 MG/1; MG/1; MG/1
TABLET ORAL
Qty: 180 TABLET | Refills: 0 | Status: SHIPPED | OUTPATIENT
Start: 2024-02-01

## 2024-02-01 ASSESSMENT — PATIENT HEALTH QUESTIONNAIRE - PHQ9
2. FEELING DOWN, DEPRESSED OR HOPELESS: 0
SUM OF ALL RESPONSES TO PHQ QUESTIONS 1-9: 0
SUM OF ALL RESPONSES TO PHQ QUESTIONS 1-9: 0
SUM OF ALL RESPONSES TO PHQ9 QUESTIONS 1 & 2: 0
SUM OF ALL RESPONSES TO PHQ QUESTIONS 1-9: 0
1. LITTLE INTEREST OR PLEASURE IN DOING THINGS: 0
SUM OF ALL RESPONSES TO PHQ QUESTIONS 1-9: 0

## 2024-02-01 ASSESSMENT — ENCOUNTER SYMPTOMS
COUGH: 0
SHORTNESS OF BREATH: 0
DIARRHEA: 0
CONSTIPATION: 0

## 2024-02-01 NOTE — PROGRESS NOTES
Lucy Holt is a 63 y.o. year old female here for:    Chief Complaint:    Chief Complaint   Patient presents with    Medication Refill       Subjective:         HPI:       Patient presents today to follow-up regarding hypertension.  Also continues to report an itching in the mid upper back.  States that the area of concern is right under the bra line.  States that the skin does not look any different from the surrounding area, but it is very itchy compared to adjacent areas.  Also states that she would like to go over lab work that was done previously.  States that she was never called regarding this and was curious about the results.  She states that she still continues to suffer from intermittent headaches but they are well-controlled with either ibuprofen or Fioricet for severe headaches.  No other acute concerns at this time.    Past Medical History:   Diagnosis Date    Brain aneurysm 08/2011    Dr Carolina:     Headache(784.0)     Hypertension     Kidney stone on right side 11/21/2017    on CT, nonobstructing    Sarcoid 5/2015    bilateral eyes: Matheus Moe MD     Social History     Tobacco Use    Smoking status: Never    Smokeless tobacco: Never   Substance Use Topics    Alcohol use: No     Alcohol/week: 0.0 standard drinks of alcohol    Drug use: No     Family History   Problem Relation Age of Onset    Hypertension Mother     Diabetes Mother      Past Surgical History:   Procedure Laterality Date    BRAIN ANEURYSM SURGERY  8/2011    [facility]: Dr Carolina    ENDOMETRIAL ABLATION  08/2011    [facility] TOOTH EXTRACTION           Current Outpatient Medications:     valsartan-hydroCHLOROthiazide (DIOVAN-HCT) 160-12.5 MG per tablet, Take 1 tablet by mouth daily, Disp: 90 tablet, Rfl: 1    amLODIPine (NORVASC) 5 MG tablet, Take 1 tablet by mouth daily, Disp: 90 tablet, Rfl: 1    butalbital-acetaminophen-caffeine (FIORICET, ESGIC) -40 MG per tablet, TAKE 1 TAB BY MOUTH EVERY 4 HOURS AS NEEDED FOR HEADACHE,

## 2024-02-05 DIAGNOSIS — R51.9 HEADACHE, UNSPECIFIED HEADACHE TYPE: ICD-10-CM

## 2024-02-05 RX ORDER — BUTALBITAL, ACETAMINOPHEN AND CAFFEINE 50; 325; 40 MG/1; MG/1; MG/1
TABLET ORAL
Qty: 180 TABLET | Refills: 0 | OUTPATIENT
Start: 2024-02-05

## 2024-02-05 NOTE — TELEPHONE ENCOUNTER
Medication and Quantity requested: butalbital-acetaminophen-caffeine (FIORICET, ESGIC) -40 MG per tablet [[id number]]      Last Visit  2/1/24    Pharmacy and phone number updated in EPIC:  yes    E-Prescribing Status: Transmission to pharmacy failed (2/2/2024 11:30 AM EST)

## 2024-02-07 DIAGNOSIS — R51.9 HEADACHE, UNSPECIFIED HEADACHE TYPE: ICD-10-CM

## 2024-02-07 RX ORDER — BUTALBITAL, ACETAMINOPHEN AND CAFFEINE 50; 325; 40 MG/1; MG/1; MG/1
TABLET ORAL
Qty: 180 TABLET | Refills: 0 | Status: SHIPPED | OUTPATIENT
Start: 2024-02-07 | End: 2024-02-09 | Stop reason: SDUPTHER

## 2024-02-07 NOTE — TELEPHONE ENCOUNTER
butalbital-acetaminophen-caffeine (FIORICET, ESGIC) -40 MG per tablet     Patient called and said the pharmacy did not receive the first refill request on the 2/1/24. Please call pharmacy to get above medication called in for the patient. Please call patient back with an update

## 2024-02-07 NOTE — TELEPHONE ENCOUNTER
Pt calling again to see why her prescription hasn't been sent to the pharmacy    Please see my previous message showing that the transaction failed to sent the prescription to the pharmacy

## 2024-02-07 NOTE — TELEPHONE ENCOUNTER
Medication:   Requested Prescriptions     Pending Prescriptions Disp Refills    butalbital-acetaminophen-caffeine (FIORICET, ESGIC) -40 MG per tablet [Pharmacy Med Name: [id number] -40] 180 tablet 0     Sig: TAKE 1 TAB BY MOUTH EVERY 4 HOURS AS NEEDED FOR HEADACHE.        Last Filled:  02/01/2024    Patient Phone Number: [phone] (home) [phone] (work)    Last appt: 2/1/2024   Next appt: Visit date not found    Last OARRS:        No data to display

## 2024-02-08 RX ORDER — BUTALBITAL, ACETAMINOPHEN AND CAFFEINE 50; 325; 40 MG/1; MG/1; MG/1
TABLET ORAL
Qty: 180 TABLET | Refills: 0 | OUTPATIENT
Start: 2024-02-08

## 2024-02-09 ENCOUNTER — TELEPHONE (OUTPATIENT)
Dept: FAMILY MEDICINE CLINIC | Age: 64
End: 2024-02-09

## 2024-02-09 DIAGNOSIS — R51.9 HEADACHE, UNSPECIFIED HEADACHE TYPE: ICD-10-CM

## 2024-02-09 RX ORDER — BUTALBITAL, ACETAMINOPHEN AND CAFFEINE 50; 325; 40 MG/1; MG/1; MG/1
1 TABLET ORAL EVERY 4 HOURS PRN
Qty: 180 TABLET | Refills: 3 | Status: SHIPPED | OUTPATIENT
Start: 2024-02-09

## 2024-02-09 RX ORDER — BUTALBITAL, ACETAMINOPHEN AND CAFFEINE 50; 325; 40 MG/1; MG/1; MG/1
1 TABLET ORAL EVERY 4 HOURS PRN
Qty: 180 TABLET | Refills: 3 | OUTPATIENT
Start: 2024-02-09

## 2024-02-09 RX ORDER — BUTALBITAL, ACETAMINOPHEN AND CAFFEINE 50; 325; 40 MG/1; MG/1; MG/1
TABLET ORAL
Qty: 180 TABLET | Refills: 0 | Status: SHIPPED | OUTPATIENT
Start: 2024-02-09 | End: 2024-02-09

## 2024-02-09 RX ORDER — BUTALBITAL, ACETAMINOPHEN AND CAFFEINE 50; 325; 40 MG/1; MG/1; MG/1
TABLET ORAL
Qty: 180 TABLET | Refills: 0 | Status: SHIPPED | OUTPATIENT
Start: 2024-02-09 | End: 2024-02-09 | Stop reason: SDUPTHER

## 2024-02-09 NOTE — TELEPHONE ENCOUNTER
PT called regarding prescribed Butalbital-ARAP-Caffeine from [facility].  [facility] advised that there was a missing code CON or DX code.    This has be escalated to Tri already and code will be re-submitted.    PT would like a call back because she is heading out of town today and want to be reassured her medication refill will be there.    [phone]

## 2024-02-09 NOTE — TELEPHONE ENCOUNTER
This prescription was cancelled by the pharmacy due to not having a CON number they could find on the prescription teed up for order the patient called the office and is in need for an up coming trip out of town

## 2024-03-06 ENCOUNTER — OFFICE VISIT (OUTPATIENT)
Dept: FAMILY MEDICINE CLINIC | Age: 64
End: 2024-03-06
Payer: COMMERCIAL

## 2024-03-06 VITALS
HEART RATE: 92 BPM | WEIGHT: 183.6 LBS | OXYGEN SATURATION: 98 % | DIASTOLIC BLOOD PRESSURE: 88 MMHG | HEIGHT: 65 IN | SYSTOLIC BLOOD PRESSURE: 136 MMHG | BODY MASS INDEX: 30.59 KG/M2

## 2024-03-06 DIAGNOSIS — R04.0 RECURRENT EPISTAXIS: Primary | ICD-10-CM

## 2024-03-06 PROCEDURE — 3075F SYST BP GE 130 - 139MM HG: CPT | Performed by: STUDENT IN AN ORGANIZED HEALTH CARE EDUCATION/TRAINING PROGRAM

## 2024-03-06 PROCEDURE — 99213 OFFICE O/P EST LOW 20 MIN: CPT | Performed by: STUDENT IN AN ORGANIZED HEALTH CARE EDUCATION/TRAINING PROGRAM

## 2024-03-06 PROCEDURE — 3079F DIAST BP 80-89 MM HG: CPT | Performed by: STUDENT IN AN ORGANIZED HEALTH CARE EDUCATION/TRAINING PROGRAM

## 2024-03-07 ENCOUNTER — TELEPHONE (OUTPATIENT)
Dept: ENT CLINIC | Age: 64
End: 2024-03-07

## 2024-03-07 ASSESSMENT — ENCOUNTER SYMPTOMS
COUGH: 0
SHORTNESS OF BREATH: 0
DIARRHEA: 0
CONSTIPATION: 0

## 2024-03-07 NOTE — TELEPHONE ENCOUNTER
Pt called in wanting to know if she can be seen earlier than April due to her continuous nose bleeds? I told her I would send you or a msg and get back with her. Please advise. Thank you.

## 2024-03-07 NOTE — PROGRESS NOTES
Lucy Holt is a 63 y.o. year old female here for:    Chief Complaint:    Chief Complaint   Patient presents with    Epistaxis       Subjective:         HPI:       Patient presenting today complaining of recurrent epistaxis.  States that she had experienced an episode of nosebleeding last summer and that she was given a nasal spray as well as a nasal clip to try to take care of it at home if it happened in the future.  She states that she started experiencing nosebleeding again this morning and that it lasted for several hours with her coughing/choking on blood.  States that the nosebleed has since stopped after using the nasal spray in the nose clamp; however, she is interested in seeing ENT to see if there is anything that can be done to prevent recurrence of nosebleed in the future.  She states that there has been nothing out of the ordinary today or in the past few days in terms of new medications, foods, exposures, travel, etc.  No other concerns at this time.    Past Medical History:   Diagnosis Date    Brain aneurysm 08/2011    Dr Carolina:     Headache(784.0)     Hypertension     Kidney stone on right side 11/21/2017    on CT, nonobstructing    Sarcoid 5/2015    bilateral eyes: Matheus Moe MD     Social History     Tobacco Use    Smoking status: Never    Smokeless tobacco: Never   Substance Use Topics    Alcohol use: No     Alcohol/week: 0.0 standard drinks of alcohol    Drug use: No     Family History   Problem Relation Age of Onset    Hypertension Mother     Diabetes Mother      Past Surgical History:   Procedure Laterality Date    BRAIN ANEURYSM SURGERY  8/2011    [facility]: Dr Carolina    ENDOMETRIAL ABLATION  08/2011    [facility] TOOTH EXTRACTION           Current Outpatient Medications:     butalbital-acetaminophen-caffeine (FIORICET, ESGIC) -40 MG per tablet, Take 1 tablet by mouth every 4 hours as needed for Headaches, Disp: 180 tablet, Rfl: 3    valsartan-hydroCHLOROthiazide (DIOVAN-HCT)

## 2024-03-12 ENCOUNTER — TELEPHONE (OUTPATIENT)
Dept: FAMILY MEDICINE CLINIC | Age: 64
End: 2024-03-12

## 2024-03-12 NOTE — TELEPHONE ENCOUNTER
Pt called and said she had blood work done on 3/10 when she was in the ED and wanted to get the results on those labs and what the next steps should be. Patient is trying to schedule with the  Otolaryngologist flower referred pt to see. Patient wants advice on if she is taking the right steps to in the mean time to prevent nosebleeds until she get in to see the provider

## 2024-03-15 NOTE — TELEPHONE ENCOUNTER
Patient called and needs to know if we have received her results and ER visit she had faxed over from the ER    [facility] [place] [phone] please call Er if notes haven't been received yet     Patient would like to have a call back today before she leaves [place] to make sure we have them     Please call patient at [phone]

## 2024-03-15 NOTE — TELEPHONE ENCOUNTER
We have to fax over a release of information so that can take 24 hours to receive results for dr patel   Will fax

## 2024-03-19 ENCOUNTER — OFFICE VISIT (OUTPATIENT)
Dept: ENT CLINIC | Age: 64
End: 2024-03-19
Payer: COMMERCIAL

## 2024-03-19 VITALS
SYSTOLIC BLOOD PRESSURE: 136 MMHG | OXYGEN SATURATION: 97 % | BODY MASS INDEX: 30.32 KG/M2 | HEART RATE: 88 BPM | WEIGHT: 182 LBS | TEMPERATURE: 97.3 F | DIASTOLIC BLOOD PRESSURE: 91 MMHG | HEIGHT: 65 IN

## 2024-03-19 DIAGNOSIS — J34.89 NASAL MUCOSA DRY: ICD-10-CM

## 2024-03-19 DIAGNOSIS — R04.0 EPISTAXIS: Primary | ICD-10-CM

## 2024-03-19 PROCEDURE — 3080F DIAST BP >= 90 MM HG: CPT | Performed by: STUDENT IN AN ORGANIZED HEALTH CARE EDUCATION/TRAINING PROGRAM

## 2024-03-19 PROCEDURE — 99203 OFFICE O/P NEW LOW 30 MIN: CPT | Performed by: STUDENT IN AN ORGANIZED HEALTH CARE EDUCATION/TRAINING PROGRAM

## 2024-03-19 PROCEDURE — 3075F SYST BP GE 130 - 139MM HG: CPT | Performed by: STUDENT IN AN ORGANIZED HEALTH CARE EDUCATION/TRAINING PROGRAM

## 2024-03-19 RX ORDER — OXYMETAZOLINE HYDROCHLORIDE 0.05 G/100ML
2 SPRAY NASAL PRN
Qty: 1 EACH | Refills: 1 | Status: SHIPPED | OUTPATIENT
Start: 2024-03-19 | End: 2025-03-19

## 2024-03-19 RX ORDER — ECHINACEA PURPUREA EXTRACT 125 MG
2 TABLET ORAL 4 TIMES DAILY
Qty: 1 EACH | Refills: 3 | Status: SHIPPED | OUTPATIENT
Start: 2024-03-19

## 2024-03-19 NOTE — PATIENT INSTRUCTIONS
DR. TRAN'S NOSEBLEED INSTRUCTIONS:    - Obtain nasal saline spray over the counter. Spray nasal saline spray or gel multiple times a day (up to 5-6 times throughout the day) in each nostril to help with nasal mucosal moisturization for the next 3 weeks. You can get this over the counter. You cannot over use this!  - Place antibiotic ointment (I.e. polysporin, triple antiobiotic ointment, bactroban) to inside of both nostrils and along the nasal septum twice daily for next 3 weeks then as needed for moisturization.  - Consider humidification machine in the bedroom to help with nasal moisturization  - Avoid nasal trauma nose picking, harsh nasal blowing, rubbing nose after blowing! If you need to blow your nose blow very gently and do not harshly wipe nose afterwards.  - In case of another nosebleed: First spray AFRIN (can obtain over the counter) in affected nostril, then saturate a cottonball with Afrin and place it on the affected side and then placing unrelenting digital pressure for at least 15 minutes.  After this take the cottonball allowed and reinspected.  If still bleeding please repeat.  If Bleeding does not stop after 1 hour or you lose a large amount of blood go to emergency department.

## 2024-03-19 NOTE — PROGRESS NOTES
[facility]  DIVISION OF OTOLARYNGOLOGY- HEAD & NECK SURGERY  NEW PATIENT HISTORY AND PHYSICAL NOTE      Patient Name: Lucy Holt  Medical Record Number:  [id number]  Primary Care Physician:  Maurice Segura MD    ChiefComplaint     Chief Complaint   Patient presents with    Epistaxis     Nose bleeds started in June, recurrent epistaxis,        History of Present Illness     Lucy Holt is an 63 y.o. female presenting with.  She refers next week in June 2023, went to the ED where eventually stopped without packing or cauterization.  She did not have any issues with nosebleeds until 3/6/2024 and she woke up and she was swallowing blood.  She placed a nasal clamp and used Afrin which helped stop the bleeding.  She saw her PCP on that day who placed a referral to ENT.  She was on vacation in [place] on 3/11 and when walking around [facility] she had a spontaneous nosebleed, worse on the right.  She placed a clamp on her nose and spit up half a cup of blood.  She went to the ED in [place] and her bleeding stopped.    No history of sinonasal surgery or cauterization in the past.  She uses nasal saline sprays at night for nasal dryness.  She feels like the dryness in her nose is worse on the right.  She takes Tylenol and Fioricet as needed for headaches.  Today she had some clear drainage from her right nostril.  No use of blood thinners or anticoagulants.    Of note she has a history of a brain aneurysm diagnosed in 2011 with surgery by Dr. Carolina at [facility].      Past Medical History     Past Medical History:   Diagnosis Date    Brain aneurysm 08/2011    Dr Carolina:     Headache(784.0)     Hypertension     Kidney stone on right side 11/21/2017    on CT, nonobstructing    Sarcoid 5/2015    bilateral eyes: Matheus Moe MD       Past Surgical History     Past Surgical History:   Procedure Laterality Date    BRAIN ANEURYSM SURGERY  8/2011    [facility]: Dr Carolina    ENDOMETRIAL ABLATION  08/2011

## 2024-05-17 ENCOUNTER — OFFICE VISIT (OUTPATIENT)
Dept: FAMILY MEDICINE CLINIC | Age: 64
End: 2024-05-17
Payer: COMMERCIAL

## 2024-05-17 VITALS
DIASTOLIC BLOOD PRESSURE: 88 MMHG | SYSTOLIC BLOOD PRESSURE: 130 MMHG | OXYGEN SATURATION: 99 % | WEIGHT: 181 LBS | HEART RATE: 85 BPM | BODY MASS INDEX: 30.12 KG/M2

## 2024-05-17 DIAGNOSIS — M54.50 ACUTE RIGHT-SIDED LOW BACK PAIN WITHOUT SCIATICA: Primary | ICD-10-CM

## 2024-05-17 PROCEDURE — 99213 OFFICE O/P EST LOW 20 MIN: CPT | Performed by: FAMILY MEDICINE

## 2024-05-17 PROCEDURE — 3079F DIAST BP 80-89 MM HG: CPT | Performed by: FAMILY MEDICINE

## 2024-05-17 PROCEDURE — 3075F SYST BP GE 130 - 139MM HG: CPT | Performed by: FAMILY MEDICINE

## 2024-05-17 RX ORDER — LIDOCAINE 50 MG/G
1 PATCH TOPICAL DAILY
Qty: 10 PATCH | Refills: 1 | Status: SHIPPED | OUTPATIENT
Start: 2024-05-17 | End: 2024-06-06

## 2024-05-17 RX ORDER — CYCLOBENZAPRINE HCL 10 MG
10 TABLET ORAL 3 TIMES DAILY PRN
Qty: 30 TABLET | Refills: 1 | Status: SHIPPED | OUTPATIENT
Start: 2024-05-17 | End: 2024-06-06

## 2024-05-17 SDOH — ECONOMIC STABILITY: FOOD INSECURITY: WITHIN THE PAST 12 MONTHS, YOU WORRIED THAT YOUR FOOD WOULD RUN OUT BEFORE YOU GOT MONEY TO BUY MORE.: NEVER TRUE

## 2024-05-17 SDOH — ECONOMIC STABILITY: INCOME INSECURITY: HOW HARD IS IT FOR YOU TO PAY FOR THE VERY BASICS LIKE FOOD, HOUSING, MEDICAL CARE, AND HEATING?: NOT HARD AT ALL

## 2024-05-17 SDOH — ECONOMIC STABILITY: FOOD INSECURITY: WITHIN THE PAST 12 MONTHS, THE FOOD YOU BOUGHT JUST DIDN'T LAST AND YOU DIDN'T HAVE MONEY TO GET MORE.: NEVER TRUE

## 2024-05-17 ASSESSMENT — ENCOUNTER SYMPTOMS
RESPIRATORY NEGATIVE: 1
GASTROINTESTINAL NEGATIVE: 1
BACK PAIN: 1

## 2024-05-17 NOTE — PROGRESS NOTES
Lucy Holt (:  1960) is a 63 y.o. female,Established patient, here for evaluation of the following chief complaint(s):  Rectal Pain (pain in right top of buttox area making it hard to walk for about a week now /)      Assessment & Plan   1. Acute right-sided low back pain without sciatica  -     lidocaine (LIDODERM) 5 %; Place 1 patch onto the skin daily for 20 days 12 hours on, 12 hours off., Disp-10 patch, R-1Normal  -     cyclobenzaprine (FLEXERIL) 10 MG tablet; Take 1 tablet by mouth 3 times daily as needed for Muscle spasms, Disp-30 tablet, R-1Normal  Continue ibuprofen as needed  Ice/heat    Return if symptoms worsen or fail to improve.       Subjective   HPI  Started with buttock pain last week after pulling weeds.  She has been using ibuprofen and Aspercreme roll on prn and obtains temporary relief.  Patient states discomfort is okay when she is standing or sitting or walking it is only when she gets from a sit and starts to walk that it seems to hurt.    Review of Systems   Constitutional: Negative.    Respiratory: Negative.     Cardiovascular: Negative.    Gastrointestinal: Negative.    Endocrine: Negative.    Genitourinary: Negative.    Musculoskeletal:  Positive for back pain.   Skin:  Negative for rash.   Neurological:  Positive for headaches (chronic). Negative for numbness.          Objective   Physical Exam  Constitutional:       General: She is not in acute distress.     Appearance: She is not ill-appearing, toxic-appearing or diaphoretic.   HENT:      Head: Normocephalic and atraumatic.   Eyes:      Conjunctiva/sclera: Conjunctivae normal.   Musculoskeletal:        Back:    Skin:     General: Skin is warm and dry.   Neurological:      Mental Status: She is alert and oriented to person, place, and time.   Psychiatric:         Mood and Affect: Mood normal.         Behavior: Behavior normal.         Thought Content: Thought content normal.         Judgment: Judgment normal.

## 2024-05-20 ENCOUNTER — TELEPHONE (OUTPATIENT)
Dept: FAMILY MEDICINE CLINIC | Age: 64
End: 2024-05-20

## 2024-05-20 NOTE — TELEPHONE ENCOUNTER
Office has been notified that pt is requiring Prior Authorization for the following medication:  --   lidocaine (LIDODERM) 5 % [[id number]]     Please initiate this request through CoverMyMeds, contacting the following Payor/Insurance:  -- [employer] - [place] PPO     Please see below, or the documentation attached to this encounter for any additional information that may assist in processing PA:  -- Associated Diagnoses: Acute right-sided low back pain without sciatica [M54.50]     Thank you!

## 2024-05-21 NOTE — TELEPHONE ENCOUNTER
Submitted PA for Lidocaine 5% patches   Via CMM Key: [id number] STATUS: PENDING.    Follow up done daily; if no decision with in three days we will refax.  If another three days goes by with no decision will call the insurance for status.

## 2024-05-31 ENCOUNTER — TELEPHONE (OUTPATIENT)
Dept: FAMILY MEDICINE CLINIC | Age: 64
End: 2024-05-31

## 2024-05-31 NOTE — TELEPHONE ENCOUNTER
lidocaine (LIDODERM) 5 % [[id number]]     Pt called and was never contacted about the denial for the above patches.Pt wants to speak directly to patel about the next steps and how to go about getting this corrected

## 2024-06-03 NOTE — TELEPHONE ENCOUNTER
We are actually Carbon copy on the denial as the insurance company denial generally goes to her.  Unfortunately most insurance companies do not seem to cover this particular medication.  The next alternative is Salonpas which is an over-the-counter Lidoderm patch which is 4% rather than 5% is the only difference

## 2024-08-20 DIAGNOSIS — I10 ESSENTIAL HYPERTENSION: ICD-10-CM

## 2024-08-20 RX ORDER — VALSARTAN AND HYDROCHLOROTHIAZIDE 160; 12.5 MG/1; MG/1
1 TABLET, FILM COATED ORAL DAILY
Qty: 30 TABLET | Refills: 5 | Status: SHIPPED | OUTPATIENT
Start: 2024-08-20

## 2024-08-20 NOTE — TELEPHONE ENCOUNTER
Medication:   Requested Prescriptions     Pending Prescriptions Disp Refills    valsartan-hydroCHLOROthiazide (DIOVAN-HCT) 160-12.5 MG per tablet [Pharmacy Med Name: VALSARTAN-HCTZ 160-12.5 MG TAB] 30 tablet 5     Sig: TAKE 1 TABLET BY MOUTH EVERY DAY       Last Filled:  2/1/024 90 tabs 1 refill    Patient Phone Number: [phone] (home) [phone] (work)    Last appt: 5/17/2024   Next appt: Visit date not found    Lab Results   Component Value Date     05/10/2023    K 4.2 05/10/2023     05/10/2023    CO2 28 05/10/2023    BUN 17 05/10/2023    CREATININE 0.9 05/10/2023    GLUCOSE 80 05/10/2023    CALCIUM 9.4 05/10/2023    BILITOT <0.2 05/10/2023    ALKPHOS 100 05/10/2023    AST 21 05/10/2023    ALT 13 05/10/2023    LABGLOM >60 05/10/2023    GFRAA >60 08/10/2020    AGRATIO 1.5 05/10/2023    GLOB 2.9 08/10/2020

## 2024-09-13 ENCOUNTER — TELEPHONE (OUTPATIENT)
Dept: FAMILY MEDICINE CLINIC | Age: 64
End: 2024-09-13

## 2024-09-17 ENCOUNTER — OFFICE VISIT (OUTPATIENT)
Dept: FAMILY MEDICINE CLINIC | Age: 64
End: 2024-09-17
Payer: COMMERCIAL

## 2024-09-17 VITALS
BODY MASS INDEX: 29.99 KG/M2 | HEART RATE: 90 BPM | DIASTOLIC BLOOD PRESSURE: 72 MMHG | HEIGHT: 65 IN | OXYGEN SATURATION: 99 % | SYSTOLIC BLOOD PRESSURE: 128 MMHG | WEIGHT: 180 LBS | TEMPERATURE: 97.6 F

## 2024-09-17 DIAGNOSIS — R80.9 PROTEINURIA, UNSPECIFIED TYPE: ICD-10-CM

## 2024-09-17 DIAGNOSIS — R30.0 DYSURIA: Primary | ICD-10-CM

## 2024-09-17 DIAGNOSIS — R35.0 URINARY FREQUENCY: ICD-10-CM

## 2024-09-17 DIAGNOSIS — N30.00 ACUTE CYSTITIS WITHOUT HEMATURIA: ICD-10-CM

## 2024-09-17 DIAGNOSIS — Z00.00 ANNUAL PHYSICAL EXAM: ICD-10-CM

## 2024-09-17 LAB
BILIRUBIN, POC: NORMAL
BLOOD URINE, POC: NORMAL
CLARITY, POC: NORMAL
COLOR, POC: NORMAL
GLUCOSE URINE, POC: NORMAL MG/DL
KETONES, POC: NORMAL MG/DL
LEUKOCYTE EST, POC: NORMAL
NITRITE, POC: NORMAL
PH, POC: 5.5
PROTEIN, POC: NORMAL MG/DL
SPECIFIC GRAVITY, POC: 1
UROBILINOGEN, POC: 0.2 MG/DL

## 2024-09-17 PROCEDURE — 81002 URINALYSIS NONAUTO W/O SCOPE: CPT | Performed by: FAMILY MEDICINE

## 2024-09-17 PROCEDURE — 3078F DIAST BP <80 MM HG: CPT | Performed by: FAMILY MEDICINE

## 2024-09-17 PROCEDURE — 99213 OFFICE O/P EST LOW 20 MIN: CPT | Performed by: FAMILY MEDICINE

## 2024-09-17 PROCEDURE — 3074F SYST BP LT 130 MM HG: CPT | Performed by: FAMILY MEDICINE

## 2024-09-17 RX ORDER — CIPROFLOXACIN 500 MG/1
500 TABLET, FILM COATED ORAL 2 TIMES DAILY
Qty: 10 TABLET | Refills: 0 | Status: SHIPPED | OUTPATIENT
Start: 2024-09-17 | End: 2024-09-22

## 2024-09-18 LAB — BACTERIA UR CULT: NORMAL

## 2024-09-23 ENCOUNTER — TELEPHONE (OUTPATIENT)
Dept: FAMILY MEDICINE CLINIC | Age: 64
End: 2024-09-23

## 2024-09-23 NOTE — TELEPHONE ENCOUNTER
Patient Education     Blood in the Urine    Blood in the urine (hematuria) has many possible causes. If it occurs after an injury (such as a car accident or fall), it is most often a sign of bruising to the kidney or bladder. Common causes of blood in the urine include urinary tract infections, kidney stones, inflammation, tumors, or certain other diseases of the kidney or bladder. Menstruation can cause blood to appear in the urine sample, although it is not coming from the urinary tract.  If only a trace amount of blood is present, it will show up on the urine test, even though the urine may be yellow and not pink or red. This may occur with any of the above conditions, as well as heavy exercise or high fever. In this case, your doctor may want to repeat the urine test on another day. This will show if the blood is still present. If it is, then other tests can be done to find out the cause.  Home care  Follow these home care guidelines:  · If your urine does not appear bloody (pink, brown or red) then you do not need to restrict your activity in any way.  · If you can see blood in your urine, rest and avoid heavy exertion until your next exam. Do not use aspirin, blood thinners, or anti-platelet or anti-inflammatory medicines. These include ibuprofen and naproxen. These thin the blood and may increase bleeding.  Follow-up care  Follow up with your healthcare provider, or as advised. If you were injured and had blood in your urine, you should have a repeat urine test in 1 to 2 days. Contact your doctor for this test.  A radiologist will review any X-rays that were taken. You will be told of any new findings that may affect your care.  When to seek medical advice  Call your healthcare provider right away if any of these occur:  · Bright red blood or blood clots in the urine (if you did not have this before)  · Weakness, dizziness or fainting  · New groin, abdominal, or back pain  · Fever of 100.4ºF (38ºC) or higher,  Tired to call patient to get scheduled with first available provider.   Left message to call back    or as directed by your healthcare provider  · Repeated vomiting  · Bleeding from the nose or gums or easy bruising  Date Last Reviewed: 9/1/2016 © 2000-2018 [employer]. [address] [place] [address], [place] [address]. All rights reserved. This information is not intended as a substitute for professional medical care. Always follow your healthcare professional's instructions.

## 2024-09-23 NOTE — TELEPHONE ENCOUNTER
----- Message from Harley OGDEN sent at 9/23/2024 11:23 AM EDT -----  Regarding: ECC Escalation To Practice  ECC Escalation To Practice      Type of Escalation: Acute Care Symptom  --------------------------------------------------------------------------------------------------------------------------    Information for Provider:  Patient is looking for appointment for: Symptom STOMACH BLOATING AND BLADDER PROBLEMS+COUPLE  WEEKS  Reasons for Message: Other patient's PCP already changed on file but she don't want to see her new PCP for her acute condition right now since as she said, Dr. Maurice Segura is still in the practice and wants only to see Dr. Segura    Additional Information Patient declined to see her current PCP Dr. Mott even if there are schedules available.  --------------------------------------------------------------------------------------------------------------------------    Relationship to Patient: Self     Call Back Info: OK to leave message on Bkam  Preferred Call Back Number: Phone [phone]

## 2024-09-23 NOTE — TELEPHONE ENCOUNTER
Pt called back and said she really didn't want appt since she just saw may last week. She would like an antibiotic called in. To help with her bladder pressure and leakage    [id number] - [place], [place] - [address] [place] [phone] - F [phone]

## 2024-09-24 DIAGNOSIS — Z00.00 ANNUAL PHYSICAL EXAM: ICD-10-CM

## 2024-09-25 LAB
ALBUMIN SERPL-MCNC: 4.4 G/DL (ref 3.4–5)
ALBUMIN/GLOB SERPL: 1.4 {RATIO} (ref 1.1–2.2)
ALP SERPL-CCNC: 95 U/L (ref 40–129)
ALT SERPL-CCNC: 21 U/L (ref 10–40)
ANION GAP SERPL CALCULATED.3IONS-SCNC: 13 MMOL/L (ref 3–16)
AST SERPL-CCNC: 35 U/L (ref 15–37)
BASOPHILS # BLD: 0 K/UL (ref 0–0.2)
BASOPHILS NFR BLD: 0.5 %
BILIRUB SERPL-MCNC: <0.2 MG/DL (ref 0–1)
BUN SERPL-MCNC: 13 MG/DL (ref 7–20)
CALCIUM SERPL-MCNC: 9.5 MG/DL (ref 8.3–10.6)
CHLORIDE SERPL-SCNC: 103 MMOL/L (ref 99–110)
CHOLEST SERPL-MCNC: 189 MG/DL (ref 0–199)
CO2 SERPL-SCNC: 24 MMOL/L (ref 21–32)
CREAT SERPL-MCNC: 1 MG/DL (ref 0.6–1.2)
DEPRECATED RDW RBC AUTO: 15.8 % (ref 12.4–15.4)
EOSINOPHIL # BLD: 0 K/UL (ref 0–0.6)
EOSINOPHIL NFR BLD: 0.4 %
EST. AVERAGE GLUCOSE BLD GHB EST-MCNC: 131.2 MG/DL
GFR SERPLBLD CREATININE-BSD FMLA CKD-EPI: 63 ML/MIN/{1.73_M2}
GLUCOSE SERPL-MCNC: 80 MG/DL (ref 70–99)
HBA1C MFR BLD: 6.2 %
HCT VFR BLD AUTO: 34 % (ref 36–48)
HDLC SERPL-MCNC: 59 MG/DL (ref 40–60)
HGB BLD-MCNC: 10.5 G/DL (ref 12–16)
LDL CHOLESTEROL: 114 MG/DL
LYMPHOCYTES # BLD: 0.9 K/UL (ref 1–5.1)
LYMPHOCYTES NFR BLD: 27 %
MCH RBC QN AUTO: 24.6 PG (ref 26–34)
MCHC RBC AUTO-ENTMCNC: 31 G/DL (ref 31–36)
MCV RBC AUTO: 79.4 FL (ref 80–100)
MONOCYTES # BLD: 0.5 K/UL (ref 0–1.3)
MONOCYTES NFR BLD: 12.9 %
NEUTROPHILS # BLD: 2.1 K/UL (ref 1.7–7.7)
NEUTROPHILS NFR BLD: 59.2 %
PLATELET # BLD AUTO: 210 K/UL (ref 135–450)
PMV BLD AUTO: 10 FL (ref 5–10.5)
POTASSIUM SERPL-SCNC: 3.4 MMOL/L (ref 3.5–5.1)
PROT SERPL-MCNC: 7.5 G/DL (ref 6.4–8.2)
RBC # BLD AUTO: 4.28 M/UL (ref 4–5.2)
SODIUM SERPL-SCNC: 140 MMOL/L (ref 136–145)
TRIGL SERPL-MCNC: 81 MG/DL (ref 0–150)
VLDLC SERPL CALC-MCNC: 16 MG/DL
WBC # BLD AUTO: 3.5 K/UL (ref 4–11)

## 2024-09-25 NOTE — TELEPHONE ENCOUNTER
Called and left voicemail on patient's phone in an attempt to discuss her symptoms.  Asked her to call back to the office.

## 2024-09-26 DIAGNOSIS — D72.819 LEUKOPENIA, UNSPECIFIED TYPE: Primary | ICD-10-CM

## 2024-09-26 DIAGNOSIS — D50.9 MICROCYTIC ANEMIA: ICD-10-CM

## 2024-09-26 DIAGNOSIS — E87.6 HYPOKALEMIA: ICD-10-CM

## 2024-10-14 ENCOUNTER — TELEPHONE (OUTPATIENT)
Dept: FAMILY MEDICINE CLINIC | Age: 64
End: 2024-10-14

## 2024-10-14 DIAGNOSIS — E87.6 HYPOKALEMIA: ICD-10-CM

## 2024-10-14 DIAGNOSIS — D50.9 MICROCYTIC ANEMIA: ICD-10-CM

## 2024-10-14 DIAGNOSIS — D72.819 LEUKOPENIA, UNSPECIFIED TYPE: ICD-10-CM

## 2024-10-14 LAB
BASOPHILS # BLD: 0 K/UL (ref 0–0.2)
BASOPHILS NFR BLD: 1.2 %
DEPRECATED RDW RBC AUTO: 16.3 % (ref 12.4–15.4)
EOSINOPHIL # BLD: 0 K/UL (ref 0–0.6)
EOSINOPHIL NFR BLD: 0.4 %
FERRITIN SERPL IA-MCNC: 21.4 NG/ML (ref 15–150)
HCT VFR BLD AUTO: 34.3 % (ref 36–48)
HGB BLD-MCNC: 10.7 G/DL (ref 12–16)
IRON SATN MFR SERPL: 10 % (ref 15–50)
IRON SERPL-MCNC: 38 UG/DL (ref 37–145)
LYMPHOCYTES # BLD: 0.9 K/UL (ref 1–5.1)
LYMPHOCYTES NFR BLD: 34.5 %
MCH RBC QN AUTO: 24.5 PG (ref 26–34)
MCHC RBC AUTO-ENTMCNC: 31.2 G/DL (ref 31–36)
MCV RBC AUTO: 78.7 FL (ref 80–100)
MONOCYTES # BLD: 0.4 K/UL (ref 0–1.3)
MONOCYTES NFR BLD: 14.1 %
NEUTROPHILS # BLD: 1.4 K/UL (ref 1.7–7.7)
NEUTROPHILS NFR BLD: 49.8 %
PLATELET # BLD AUTO: 178 K/UL (ref 135–450)
PMV BLD AUTO: 10.4 FL (ref 5–10.5)
POTASSIUM SERPL-SCNC: 3.5 MMOL/L (ref 3.5–5.1)
RBC # BLD AUTO: 4.36 M/UL (ref 4–5.2)
TIBC SERPL-MCNC: 391 UG/DL (ref 260–445)
WBC # BLD AUTO: 2.7 K/UL (ref 4–11)

## 2024-10-14 NOTE — TELEPHONE ENCOUNTER
Pt would like to know if a prescription can be called in to the pharmacy for Triamcinolone Cream 0.1    Pt stated that she was seen for a bug bite a couple weeks ago and the bug bite is still there    Pt stated that she has been taking benadryl for the bug bite but it's not working

## 2024-10-15 DIAGNOSIS — R21 RASH: Primary | ICD-10-CM

## 2024-10-15 RX ORDER — TRIAMCINOLONE ACETONIDE 1 MG/G
CREAM TOPICAL
Qty: 15 G | Refills: 0 | Status: SHIPPED | OUTPATIENT
Start: 2024-10-15

## 2024-10-16 ENCOUNTER — OFFICE VISIT (OUTPATIENT)
Dept: FAMILY MEDICINE CLINIC | Age: 64
End: 2024-10-16

## 2024-10-16 VITALS
SYSTOLIC BLOOD PRESSURE: 132 MMHG | TEMPERATURE: 97.5 F | BODY MASS INDEX: 29.32 KG/M2 | HEART RATE: 78 BPM | WEIGHT: 176 LBS | RESPIRATION RATE: 18 BRPM | OXYGEN SATURATION: 99 % | DIASTOLIC BLOOD PRESSURE: 74 MMHG | HEIGHT: 65 IN

## 2024-10-16 DIAGNOSIS — K59.00 CONSTIPATION, UNSPECIFIED CONSTIPATION TYPE: ICD-10-CM

## 2024-10-16 DIAGNOSIS — R21 RASH: ICD-10-CM

## 2024-10-16 DIAGNOSIS — D50.9 IRON DEFICIENCY ANEMIA, UNSPECIFIED IRON DEFICIENCY ANEMIA TYPE: ICD-10-CM

## 2024-10-16 DIAGNOSIS — R73.03 PREDIABETES: ICD-10-CM

## 2024-10-16 DIAGNOSIS — Z12.11 COLON CANCER SCREENING: ICD-10-CM

## 2024-10-16 DIAGNOSIS — D72.819 LEUKOPENIA, UNSPECIFIED TYPE: ICD-10-CM

## 2024-10-16 DIAGNOSIS — E78.00 PURE HYPERCHOLESTEROLEMIA: ICD-10-CM

## 2024-10-16 DIAGNOSIS — I10 ESSENTIAL HYPERTENSION: ICD-10-CM

## 2024-10-16 DIAGNOSIS — Z00.00 ANNUAL PHYSICAL EXAM: Primary | ICD-10-CM

## 2024-10-16 RX ORDER — METHYLPREDNISOLONE 4 MG
TABLET, DOSE PACK ORAL
Qty: 1 KIT | Refills: 0 | Status: SHIPPED | OUTPATIENT
Start: 2024-10-16

## 2024-10-16 RX ORDER — ATORVASTATIN CALCIUM 10 MG/1
10 TABLET, FILM COATED ORAL DAILY
Qty: 90 TABLET | Refills: 3 | Status: SHIPPED | OUTPATIENT
Start: 2024-10-16

## 2024-10-16 RX ORDER — FERROUS SULFATE 325(65) MG
325 TABLET ORAL
Qty: 90 TABLET | Refills: 1 | Status: SHIPPED | OUTPATIENT
Start: 2024-10-16

## 2024-10-16 RX ORDER — POLYETHYLENE GLYCOL 3350 17 G/17G
17 POWDER, FOR SOLUTION ORAL DAILY PRN
Qty: 510 G | Refills: 5 | Status: SHIPPED | OUTPATIENT
Start: 2024-10-16 | End: 2024-10-17

## 2024-10-16 ASSESSMENT — PATIENT HEALTH QUESTIONNAIRE - PHQ9
SUM OF ALL RESPONSES TO PHQ QUESTIONS 1-9: 0
SUM OF ALL RESPONSES TO PHQ9 QUESTIONS 1 & 2: 0
SUM OF ALL RESPONSES TO PHQ QUESTIONS 1-9: 0
1. LITTLE INTEREST OR PLEASURE IN DOING THINGS: NOT AT ALL
2. FEELING DOWN, DEPRESSED OR HOPELESS: NOT AT ALL

## 2024-10-16 NOTE — PROGRESS NOTES
[facility] Medicine  Clinic Note    Date: 10/16/2024                                               /Objective:     Chief Complaint   Patient presents with    Annual Exam    Discuss Labs     Discuss recent labs     Rash     On face spread from one spot up cheek and across face, x1 week, red bumps, itchy    Constipation     X2 - 3 weeks        HPI  Patient is here for annual exam.  Last mammogram over 2 years ago.  Last colonoscopy 10 years ago.  Last Pap smear.    Patient's lab recently showed low WBC, hemoglobin of 10.5, low MCV.  Had iron studies that showed decreased iron saturation.  A1c was 6.2.    Patient has history of hypertension.  Currently takes valsartan and HCTZ.  Also takes amlodipine.  Denies chest pain or shortness of breath.    Pt has rash on R cheek that has spread to her L forehead. Started about 2-3 weeks ago after she was working in her garden. Rash is itchy. Has been using topical Triamcinolone so it's a little better but is still bothersome. No SOB. No lip swelling.    Pt has complaints of dry hard stools and constipation for about a month. No abdominal pain. No blood in stool.          Patient Active Problem List    Diagnosis Date Noted    Pure hypercholesterolemia 02/25/2019    Sarcoid     HA (headache) 06/21/2013    Essential hypertension        Past Medical History:   Diagnosis Date    Brain aneurysm 08/2011    Dr Carolina: [facility]    Headache(784.0)     Hypertension     Kidney stone on right side 11/21/2017    on CT, nonobstructing    Sarcoid 5/2015    bilateral eyes: Matheus Moe MD       Current Outpatient Medications   Medication Sig Dispense Refill    ferrous sulfate (IRON 325) 325 (65 Fe) MG tablet Take 1 tablet by mouth daily (with breakfast) 90 tablet 1    atorvastatin (LIPITOR) 10 MG tablet Take 1 tablet by mouth daily 90 tablet 3    polyethylene glycol (GLYCOLAX) 17 GM/SCOOP powder Take 17 g by mouth daily as needed (constipation) 510 g 5    methylPREDNISolone (MEDROL DOSEPACK) 4

## 2024-10-17 RX ORDER — POLYETHYLENE GLYCOL 3350 17 G/17G
POWDER, FOR SOLUTION ORAL
Qty: 238 G | Refills: 5 | Status: SHIPPED | OUTPATIENT
Start: 2024-10-17

## 2024-11-12 DIAGNOSIS — I10 ESSENTIAL HYPERTENSION: ICD-10-CM

## 2024-11-12 RX ORDER — AMLODIPINE BESYLATE 5 MG/1
5 TABLET ORAL DAILY
Qty: 90 TABLET | Refills: 1 | Status: SHIPPED | OUTPATIENT
Start: 2024-11-12

## 2024-11-12 NOTE — TELEPHONE ENCOUNTER
Medication:   Requested Prescriptions     Pending Prescriptions Disp Refills    amLODIPine (NORVASC) 5 MG tablet [Pharmacy Med Name: AMLODIPINE BESYLATE 5 MG TAB] 90 tablet 1     Sig: TAKE 1 TABLET BY MOUTH EVERY DAY       Last Filled:  02/01/2024    Patient Phone Number: [phone] (home) [phone] (work)    Last appt: 10/16/2024   Next appt: Visit date not found    Lab Results   Component Value Date     09/24/2024    K 3.5 10/14/2024     09/24/2024    CO2 24 09/24/2024    BUN 13 09/24/2024    CREATININE 1.0 09/24/2024    GLUCOSE 80 09/24/2024    CALCIUM 9.5 09/24/2024    BILITOT <0.2 09/24/2024    ALKPHOS 95 09/24/2024    AST 35 09/24/2024    ALT 21 09/24/2024    LABGLOM 63 09/24/2024    GFRAA >60 08/10/2020    AGRATIO 1.4 09/24/2024    GLOB 2.9 08/10/2020

## 2024-11-15 DIAGNOSIS — D72.819 LEUKOPENIA, UNSPECIFIED TYPE: ICD-10-CM

## 2024-11-15 LAB
BASOPHILS # BLD: 0 K/UL (ref 0–0.2)
BASOPHILS NFR BLD: 1.1 %
DEPRECATED RDW RBC AUTO: 19.9 % (ref 12.4–15.4)
EOSINOPHIL # BLD: 0 K/UL (ref 0–0.6)
EOSINOPHIL NFR BLD: 0.8 %
HCT VFR BLD AUTO: 36.7 % (ref 36–48)
HGB BLD-MCNC: 11.5 G/DL (ref 12–16)
LYMPHOCYTES # BLD: 1 K/UL (ref 1–5.1)
LYMPHOCYTES NFR BLD: 35.9 %
MCH RBC QN AUTO: 25.3 PG (ref 26–34)
MCHC RBC AUTO-ENTMCNC: 31.4 G/DL (ref 31–36)
MCV RBC AUTO: 80.6 FL (ref 80–100)
MONOCYTES # BLD: 0.4 K/UL (ref 0–1.3)
MONOCYTES NFR BLD: 14.3 %
NEUTROPHILS # BLD: 1.4 K/UL (ref 1.7–7.7)
NEUTROPHILS NFR BLD: 47.9 %
PLATELET # BLD AUTO: 211 K/UL (ref 135–450)
PMV BLD AUTO: 10.2 FL (ref 5–10.5)
RBC # BLD AUTO: 4.55 M/UL (ref 4–5.2)
WBC # BLD AUTO: 2.9 K/UL (ref 4–11)

## 2024-11-18 DIAGNOSIS — D72.819 LEUKOPENIA, UNSPECIFIED TYPE: Primary | ICD-10-CM

## 2024-12-17 ENCOUNTER — OFFICE VISIT (OUTPATIENT)
Dept: FAMILY MEDICINE CLINIC | Age: 64
End: 2024-12-17

## 2024-12-17 VITALS
TEMPERATURE: 97.6 F | OXYGEN SATURATION: 98 % | SYSTOLIC BLOOD PRESSURE: 124 MMHG | HEIGHT: 65 IN | DIASTOLIC BLOOD PRESSURE: 76 MMHG | HEART RATE: 80 BPM | WEIGHT: 174 LBS | BODY MASS INDEX: 28.99 KG/M2

## 2024-12-17 DIAGNOSIS — M25.572 ACUTE LEFT ANKLE PAIN: Primary | ICD-10-CM

## 2024-12-17 NOTE — PROGRESS NOTES
[facility] Medicine  Clinic Note    Date: 12/17/2024                                               /Objective:     Chief Complaint   Patient presents with    Fall     12/11    Ankle Injury     Left ankle is swollen. Pt has iced, elevated and taken tylenol.        HPI  Patient fallen twisted her left ankle 6 days ago in an inverted fashion..  Patient has been icing it and elevating it and taking Tylenol during this time, but is still has some swelling. Pain is mild overall. Has been improving with time.         Patient Active Problem List    Diagnosis Date Noted    Pure hypercholesterolemia 02/25/2019    Sarcoid     HA (headache) 06/21/2013    Essential hypertension        Past Medical History:   Diagnosis Date    Brain aneurysm 08/2011    Dr Carolina:     Headache(784.0)     Hypertension     Kidney stone on right side 11/21/2017    on CT, nonobstructing    Sarcoid 5/2015    bilateral eyes: Matheus Moe MD       Current Outpatient Medications   Medication Sig Dispense Refill    amLODIPine (NORVASC) 5 MG tablet TAKE 1 TABLET BY MOUTH EVERY DAY 90 tablet 1    polyethylene glycol (GLYCOLAX) 17 GM/SCOOP powder DISSOLVE 17 GRAMS IN 8 OZ OF FLUID LIQUID DRINK DAILY AS DIRECTED 238 g 5    ferrous sulfate (IRON 325) 325 (65 Fe) MG tablet Take 1 tablet by mouth daily (with breakfast) 90 tablet 1    atorvastatin (LIPITOR) 10 MG tablet Take 1 tablet by mouth daily 90 tablet 3    methylPREDNISolone (MEDROL DOSEPACK) 4 MG tablet Take by mouth. 1 kit 0    triamcinolone (KENALOG) 0.1 % cream Apply topically 2 times daily. 15 g 0    valsartan-hydroCHLOROthiazide (DIOVAN-HCT) 160-12.5 MG per tablet TAKE 1 TABLET BY MOUTH EVERY DAY 30 tablet 5    sodium chloride (OCEAN) 0.65 % nasal spray 2 sprays by Nasal route 4 times daily 1 each 3    oxymetazoline (12 HOUR NASAL SPRAY) 0.05 % nasal spray 2 sprays by Nasal route as needed (for nosebleeds) 1 each 1    nystatin (MYCOSTATIN) 870807 UNIT/GM cream Apply topically 2 times daily.

## 2024-12-20 ENCOUNTER — TELEPHONE (OUTPATIENT)
Dept: FAMILY MEDICINE CLINIC | Age: 64
End: 2024-12-20

## 2024-12-21 ENCOUNTER — HOSPITAL ENCOUNTER (OUTPATIENT)
Dept: GENERAL RADIOLOGY | Age: 64
Discharge: HOME | End: 2024-12-21
Payer: COMMERCIAL

## 2024-12-21 ENCOUNTER — HOSPITAL ENCOUNTER (OUTPATIENT)
Age: 64
Discharge: HOME | End: 2024-12-21
Payer: COMMERCIAL

## 2024-12-21 DIAGNOSIS — M25.572 ACUTE LEFT ANKLE PAIN: ICD-10-CM

## 2024-12-21 PROCEDURE — 73610 X-RAY EXAM OF ANKLE: CPT

## 2025-01-08 ENCOUNTER — TELEPHONE (OUTPATIENT)
Dept: FAMILY MEDICINE CLINIC | Age: 65
End: 2025-01-08

## 2025-01-08 DIAGNOSIS — M25.572 ACUTE LEFT ANKLE PAIN: Primary | ICD-10-CM

## 2025-01-08 NOTE — TELEPHONE ENCOUNTER
Pt called and is having reoccurring pain in her left ankle. Its still swollen and she has trouble wearing certain shoes and moving it a certain way.    Slipped 5-6 weeks ago and has had issues since. She was seen in the hospital for ankle on 12/21/24 and had an x ray done.     Ace bandage and rice not helping and taking over the counter advil for the pain. She's concerned that she's re damaging it by the way she's moving/ walking.    Pt would like to  a MRI referral. Has asked if it could be [facility]. Checking with insurance to see if insurance covers.     Please call and advise   [phone] on secure line.

## 2025-03-10 ENCOUNTER — OFFICE VISIT (OUTPATIENT)
Dept: FAMILY MEDICINE CLINIC | Age: 65
End: 2025-03-10
Payer: COMMERCIAL

## 2025-03-10 VITALS
OXYGEN SATURATION: 99 % | BODY MASS INDEX: 27.31 KG/M2 | WEIGHT: 174 LBS | TEMPERATURE: 97.4 F | HEIGHT: 67 IN | SYSTOLIC BLOOD PRESSURE: 136 MMHG | DIASTOLIC BLOOD PRESSURE: 80 MMHG | HEART RATE: 82 BPM

## 2025-03-10 DIAGNOSIS — B96.89 ACUTE BACTERIAL SINUSITIS: ICD-10-CM

## 2025-03-10 DIAGNOSIS — R49.0 HOARSE VOICE QUALITY: ICD-10-CM

## 2025-03-10 DIAGNOSIS — R05.1 ACUTE COUGH: Primary | ICD-10-CM

## 2025-03-10 DIAGNOSIS — J01.90 ACUTE BACTERIAL SINUSITIS: ICD-10-CM

## 2025-03-10 PROCEDURE — 3079F DIAST BP 80-89 MM HG: CPT | Performed by: FAMILY MEDICINE

## 2025-03-10 PROCEDURE — 99213 OFFICE O/P EST LOW 20 MIN: CPT | Performed by: FAMILY MEDICINE

## 2025-03-10 PROCEDURE — 3075F SYST BP GE 130 - 139MM HG: CPT | Performed by: FAMILY MEDICINE

## 2025-03-10 PROCEDURE — G2211 COMPLEX E/M VISIT ADD ON: HCPCS | Performed by: FAMILY MEDICINE

## 2025-03-10 SDOH — ECONOMIC STABILITY: FOOD INSECURITY: WITHIN THE PAST 12 MONTHS, YOU WORRIED THAT YOUR FOOD WOULD RUN OUT BEFORE YOU GOT MONEY TO BUY MORE.: NEVER TRUE

## 2025-03-10 SDOH — ECONOMIC STABILITY: FOOD INSECURITY: WITHIN THE PAST 12 MONTHS, THE FOOD YOU BOUGHT JUST DIDN'T LAST AND YOU DIDN'T HAVE MONEY TO GET MORE.: NEVER TRUE

## 2025-03-10 ASSESSMENT — PATIENT HEALTH QUESTIONNAIRE - PHQ9
2. FEELING DOWN, DEPRESSED OR HOPELESS: NOT AT ALL
SUM OF ALL RESPONSES TO PHQ QUESTIONS 1-9: 0
1. LITTLE INTEREST OR PLEASURE IN DOING THINGS: NOT AT ALL
SUM OF ALL RESPONSES TO PHQ QUESTIONS 1-9: 0

## 2025-03-10 NOTE — PROGRESS NOTES
return here if worse or go to nearest ER  Encourage fluids  Pt discharged in stable condition at 09:48      1. Acute cough  -     amoxicillin-clavulanate (AUGMENTIN) 875-125 MG per tablet; Take 1 tablet by mouth 2 times daily for 10 days, Disp-20 tablet, R-0Normal  2. Acute bacterial sinusitis  -     amoxicillin-clavulanate (AUGMENTIN) 875-125 MG per tablet; Take 1 tablet by mouth 2 times daily for 10 days, Disp-20 tablet, R-0Normal  3. Hoarse voice quality  -     amoxicillin-clavulanate (AUGMENTIN) 875-125 MG per tablet; Take 1 tablet by mouth 2 times daily for 10 days, Disp-20 tablet, R-0Normal       No orders of the defined types were placed in this encounter.      No follow-ups on file.    CRISPIN GUTIERREZ MD, MD    3/10/2025  9:47 AM

## 2025-04-09 DIAGNOSIS — D50.9 IRON DEFICIENCY ANEMIA, UNSPECIFIED IRON DEFICIENCY ANEMIA TYPE: ICD-10-CM

## 2025-04-10 RX ORDER — FERROUS SULFATE 325(65) MG
1 TABLET ORAL
Qty: 90 TABLET | Refills: 1 | Status: SHIPPED | OUTPATIENT
Start: 2025-04-10

## 2025-04-10 NOTE — TELEPHONE ENCOUNTER
Medication:   Requested Prescriptions     Pending Prescriptions Disp Refills    ferrous sulfate (IRON 325) 325 (65 Fe) MG tablet [Pharmacy Med Name: FERROUS SULFATE 325 MG TABLET] 90 tablet 1     Sig: TAKE 1 TABLET BY MOUTH EVERY DAY WITH BREAKFAST        Last Filled:  10/16/24    Patient Phone Number: [phone] (home) [phone] (work)    Last appt: 3/10/2025   Next appt: Visit date not found    Last OARRS:        No data to display

## 2025-04-14 DIAGNOSIS — I10 ESSENTIAL HYPERTENSION: ICD-10-CM

## 2025-04-14 RX ORDER — VALSARTAN AND HYDROCHLOROTHIAZIDE 160; 12.5 MG/1; MG/1
1 TABLET, FILM COATED ORAL DAILY
Qty: 90 TABLET | Refills: 1 | Status: SHIPPED | OUTPATIENT
Start: 2025-04-14

## 2025-04-14 NOTE — TELEPHONE ENCOUNTER
Medication:   Requested Prescriptions     Pending Prescriptions Disp Refills    valsartan-hydroCHLOROthiazide (DIOVAN-HCT) 160-12.5 MG per tablet 30 tablet 5     Sig: Take 1 tablet by mouth daily        Last Filled:  08/20/2024    Patient Phone Number: [phone] (home) [phone] (work)    Last appt: 3/10/2025   Next appt: Visit date not found    Last OARRS:        No data to display

## 2025-04-14 NOTE — TELEPHONE ENCOUNTER
Medication and Quantity requested:          valsartan-hydroCHLOROthiazide (DIOVAN-HCT) 160-12.5 MG per tablet [[id number]]         Last Visit  03/10/2025    Pharmacy and phone number updated in EPIC:  yes    [facility] [place] [address]

## 2025-05-04 DIAGNOSIS — I10 ESSENTIAL HYPERTENSION: ICD-10-CM

## 2025-05-05 RX ORDER — AMLODIPINE BESYLATE 5 MG/1
5 TABLET ORAL DAILY
Qty: 90 TABLET | Refills: 1 | Status: SHIPPED | OUTPATIENT
Start: 2025-05-05

## 2025-05-05 NOTE — TELEPHONE ENCOUNTER
Medication:   Requested Prescriptions     Pending Prescriptions Disp Refills    amLODIPine (NORVASC) 5 MG tablet [Pharmacy Med Name: AMLODIPINE BESYLATE 5 MG TAB] 30 tablet 5     Sig: TAKE 1 TABLET BY MOUTH EVERY DAY       Last Filled:  11/12/2024    Patient Phone Number: [phone] (home) [phone] (work)    Last appt: 3/10/2025   Next appt: Visit date not found    Lab Results   Component Value Date     09/24/2024    K 3.5 10/14/2024     09/24/2024    CO2 24 09/24/2024    BUN 13 09/24/2024    CREATININE 1.0 09/24/2024    GLUCOSE 80 09/24/2024    CALCIUM 9.5 09/24/2024    BILITOT <0.2 09/24/2024    ALKPHOS 95 09/24/2024    AST 35 09/24/2024    ALT 21 09/24/2024    LABGLOM 63 09/24/2024    GFRAA >60 08/10/2020    AGRATIO 1.4 09/24/2024    GLOB 2.9 08/10/2020

## 2025-06-14 ENCOUNTER — TELEPHONE (OUTPATIENT)
Dept: PRIMARY CARE CLINIC | Age: 65
End: 2025-06-14

## 2025-06-16 ENCOUNTER — OFFICE VISIT (OUTPATIENT)
Dept: FAMILY MEDICINE CLINIC | Age: 65
End: 2025-06-16
Payer: COMMERCIAL

## 2025-06-16 VITALS
TEMPERATURE: 98.2 F | BODY MASS INDEX: 27.7 KG/M2 | SYSTOLIC BLOOD PRESSURE: 128 MMHG | WEIGHT: 174.2 LBS | HEART RATE: 92 BPM | DIASTOLIC BLOOD PRESSURE: 80 MMHG | OXYGEN SATURATION: 99 %

## 2025-06-16 DIAGNOSIS — J30.2 SEASONAL ALLERGIES: ICD-10-CM

## 2025-06-16 DIAGNOSIS — D50.9 IRON DEFICIENCY ANEMIA, UNSPECIFIED IRON DEFICIENCY ANEMIA TYPE: ICD-10-CM

## 2025-06-16 DIAGNOSIS — D72.819 LEUKOPENIA, UNSPECIFIED TYPE: ICD-10-CM

## 2025-06-16 DIAGNOSIS — R04.0 EPISTAXIS: ICD-10-CM

## 2025-06-16 DIAGNOSIS — D72.819 LEUKOPENIA, UNSPECIFIED TYPE: Primary | ICD-10-CM

## 2025-06-16 LAB
BASOPHILS # BLD: 0 K/UL (ref 0–0.2)
BASOPHILS NFR BLD: 1.1 %
DEPRECATED RDW RBC AUTO: 13.6 % (ref 12.4–15.4)
EOSINOPHIL # BLD: 0 K/UL (ref 0–0.6)
EOSINOPHIL NFR BLD: 0.9 %
HCT VFR BLD AUTO: 38 % (ref 36–48)
HGB BLD-MCNC: 12.5 G/DL (ref 12–16)
LYMPHOCYTES # BLD: 1.3 K/UL (ref 1–5.1)
LYMPHOCYTES NFR BLD: 37.1 %
MCH RBC QN AUTO: 29.2 PG (ref 26–34)
MCHC RBC AUTO-ENTMCNC: 33 G/DL (ref 31–36)
MCV RBC AUTO: 88.6 FL (ref 80–100)
MONOCYTES # BLD: 0.3 K/UL (ref 0–1.3)
MONOCYTES NFR BLD: 7.9 %
NEUTROPHILS # BLD: 1.8 K/UL (ref 1.7–7.7)
NEUTROPHILS NFR BLD: 53 %
PLATELET # BLD AUTO: 198 K/UL (ref 135–450)
PMV BLD AUTO: 10.5 FL (ref 5–10.5)
RBC # BLD AUTO: 4.29 M/UL (ref 4–5.2)
WBC # BLD AUTO: 3.5 K/UL (ref 4–11)

## 2025-06-16 PROCEDURE — 99214 OFFICE O/P EST MOD 30 MIN: CPT | Performed by: FAMILY MEDICINE

## 2025-06-16 PROCEDURE — 3079F DIAST BP 80-89 MM HG: CPT | Performed by: FAMILY MEDICINE

## 2025-06-16 PROCEDURE — 3074F SYST BP LT 130 MM HG: CPT | Performed by: FAMILY MEDICINE

## 2025-06-16 RX ORDER — FLUTICASONE PROPIONATE 50 MCG
1 SPRAY, SUSPENSION (ML) NASAL DAILY
Qty: 16 G | Refills: 2 | Status: SHIPPED | OUTPATIENT
Start: 2025-06-16

## 2025-06-16 RX ORDER — LORATADINE 10 MG/1
10 TABLET ORAL DAILY
Qty: 30 TABLET | Refills: 1 | Status: SHIPPED | OUTPATIENT
Start: 2025-06-16 | End: 2025-08-15

## 2025-06-16 NOTE — TELEPHONE ENCOUNTER
After-hours call communication:    Patient concern: Received call on call line from patient on 6/14 at 11:56am. Patient has a history of nose bleeds years ago and had been doing much better, but has had two nose bleeds since day prior. Pt felt a sensation of swallowing blood in back of throat and not much blood from front of nose, similar to past episodes. Applied a nasal clip and pressure and improved, lasted under 30 minutes. Has also been using saline nasal spray due to chronic dry nose to help with irritation.    Advice given: Discussed with patient ER precautions including heavy nose bleed and bleed >30 minutes unresponsive to home intervention. Continue with saline spray, can also use afrin 2 sprays in each nostril to stop a bleed when flaring up, do NOT use daily or consistently. Recommend schedule appt with Pcp office on Monday for exam to ensure no sites that need cauterization.    Electronically signed by Stuart Castillo,  on 6/16/2025 at 8:21 AM

## 2025-06-16 NOTE — PROGRESS NOTES
[facility] Medicine  Clinic Note    Date: 6/16/2025                                               /Objective:     Chief Complaint   Patient presents with    Epistaxis     Same thing happened last year        HPI  Epistaxis starting 4 days ago. Intermittent. Happened again 2 days ago, woke her up because she was bleeding down her throat. Used a clip for a while and it next occurred yesterday morning, clipped it again and it has since resolved. Pt has had this before about a year ago. Pt does have hx of seasonal allergies.            Patient Active Problem List    Diagnosis Date Noted    Pure hypercholesterolemia 02/25/2019    Sarcoid     HA (headache) 06/21/2013    Essential hypertension        Past Medical History:   Diagnosis Date    Brain aneurysm 08/2011    Dr Carolnia:     Headache(784.0)     Hypertension     Kidney stone on right side 11/21/2017    on CT, nonobstructing    Sarcoid 5/2015    bilateral eyes: Matheus Moe MD       Current Outpatient Medications   Medication Sig Dispense Refill    fluticasone (FLONASE) 50 MCG/ACT nasal spray 1 spray by Each Nostril route daily 16 g 2    loratadine (CLARITIN) 10 MG tablet Take 1 tablet by mouth daily 30 tablet 1    amLODIPine (NORVASC) 5 MG tablet TAKE 1 TABLET BY MOUTH EVERY DAY 90 tablet 1    valsartan-hydroCHLOROthiazide (DIOVAN-HCT) 160-12.5 MG per tablet Take 1 tablet by mouth daily 90 tablet 1    ferrous sulfate (IRON 325) 325 (65 Fe) MG tablet TAKE 1 TABLET BY MOUTH EVERY DAY WITH BREAKFAST 90 tablet 1    polyethylene glycol (GLYCOLAX) 17 GM/SCOOP powder DISSOLVE 17 GRAMS IN 8 OZ OF FLUID LIQUID DRINK DAILY AS DIRECTED 238 g 5    atorvastatin (LIPITOR) 10 MG tablet Take 1 tablet by mouth daily 90 tablet 3    triamcinolone (KENALOG) 0.1 % cream Apply topically 2 times daily. 15 g 0    sodium chloride (OCEAN) 0.65 % nasal spray 2 sprays by Nasal route 4 times daily 1 each 3    nystatin (MYCOSTATIN) [id number] UNIT/GM cream Apply topically 2 times daily. 30

## 2025-06-17 ENCOUNTER — RESULTS FOLLOW-UP (OUTPATIENT)
Dept: FAMILY MEDICINE CLINIC | Age: 65
End: 2025-06-17

## 2025-08-17 DIAGNOSIS — R04.0 EPISTAXIS: ICD-10-CM

## 2025-08-17 DIAGNOSIS — J30.2 SEASONAL ALLERGIES: ICD-10-CM

## 2025-08-18 RX ORDER — LORATADINE 10 MG/1
10 TABLET ORAL DAILY
Qty: 30 TABLET | Refills: 1 | Status: SHIPPED | OUTPATIENT
Start: 2025-08-18